# Patient Record
Sex: MALE | Race: WHITE | NOT HISPANIC OR LATINO | Employment: OTHER | URBAN - METROPOLITAN AREA
[De-identification: names, ages, dates, MRNs, and addresses within clinical notes are randomized per-mention and may not be internally consistent; named-entity substitution may affect disease eponyms.]

---

## 2017-01-18 NOTE — TELEPHONE ENCOUNTER
Metoprolol Succinate ER Oral Tablet Extended Release 24 Hour 50 MG      Last Written Prescription Date: 10/9/15  Last Fill Quantity: 90, # refills: 4  Last Office Visit with G, P or [facility] prescribing provider: 10/20/15       POTASSIUM   Date Value Ref Range Status   09/26/2015 4.3 mmol/L Final     CREATININE   Date Value Ref Range Status   09/26/2015 1.41* mg/dL Final     BP Readings from Last 3 Encounters:   10/20/15 130/78   10/09/15 138/70   06/04/15 134/58

## 2017-01-20 RX ORDER — METOPROLOL SUCCINATE 50 MG/1
TABLET, EXTENDED RELEASE ORAL
Qty: 90 TABLET | Refills: 3 | OUTPATIENT
Start: 2017-01-20

## 2017-01-22 DIAGNOSIS — I10 HYPERTENSION GOAL BP (BLOOD PRESSURE) < 140/90: Primary | Chronic | ICD-10-CM

## 2017-01-24 RX ORDER — METOPROLOL SUCCINATE 50 MG/1
TABLET, EXTENDED RELEASE ORAL
Qty: 90 TABLET | Refills: 2 | OUTPATIENT
Start: 2017-01-24

## 2017-01-24 NOTE — TELEPHONE ENCOUNTER
Patient is no longer under our care.  Declined Rx to pharmacy  Vickie Vang RN- Triage [employer]

## 2017-01-24 NOTE — TELEPHONE ENCOUNTER
Metoprolol Succinate ER Oral Tablet Extended Release 24 Hour 50 MG   Last Written Prescription Date: 10/9/15  Last Fill Quantity: 90, # refills: 4  Last Office Visit with G, P or [facility] prescribing provider: 10/20/15       POTASSIUM   Date Value Ref Range Status   09/26/2015 4.3 mmol/L Final     CREATININE   Date Value Ref Range Status   09/26/2015 1.41* mg/dL Final     BP Readings from Last 3 Encounters:   10/20/15 130/78   10/09/15 138/70   06/04/15 134/58

## 2018-07-26 ENCOUNTER — AMBULATORY - HEALTHEAST (OUTPATIENT)
Dept: ADMINISTRATIVE | Facility: CLINIC | Age: 78
End: 2018-07-26

## 2018-07-27 ENCOUNTER — OFFICE VISIT - HEALTHEAST (OUTPATIENT)
Dept: GERIATRICS | Facility: CLINIC | Age: 78
End: 2018-07-27

## 2018-07-27 DIAGNOSIS — D64.9 ANEMIA: ICD-10-CM

## 2018-07-27 DIAGNOSIS — N40.0 BPH (BENIGN PROSTATIC HYPERPLASIA): ICD-10-CM

## 2018-07-27 DIAGNOSIS — I48.91 ATRIAL FIBRILLATION (H): ICD-10-CM

## 2018-07-27 DIAGNOSIS — Z96.659 S/P TOTAL KNEE ARTHROPLASTY: ICD-10-CM

## 2018-07-28 ENCOUNTER — RECORDS - HEALTHEAST (OUTPATIENT)
Dept: LAB | Facility: CLINIC | Age: 78
End: 2018-07-28

## 2018-07-28 LAB — INR PPP: 1.69 (ref 0.9–1.1)

## 2018-07-30 ENCOUNTER — RECORDS - HEALTHEAST (OUTPATIENT)
Dept: LAB | Facility: CLINIC | Age: 78
End: 2018-07-30

## 2018-07-30 ENCOUNTER — OFFICE VISIT - HEALTHEAST (OUTPATIENT)
Dept: GERIATRICS | Facility: CLINIC | Age: 78
End: 2018-07-30

## 2018-07-30 DIAGNOSIS — G47.00 INSOMNIA: ICD-10-CM

## 2018-07-30 DIAGNOSIS — Z96.659 S/P TOTAL KNEE ARTHROPLASTY: ICD-10-CM

## 2018-07-30 DIAGNOSIS — I48.91 ATRIAL FIBRILLATION (H): ICD-10-CM

## 2018-07-30 LAB — INR PPP: 1.76 (ref 0.9–1.1)

## 2018-07-31 ENCOUNTER — COMMUNICATION - HEALTHEAST (OUTPATIENT)
Dept: GERIATRICS | Facility: CLINIC | Age: 78
End: 2018-07-31

## 2018-08-01 ENCOUNTER — OFFICE VISIT - HEALTHEAST (OUTPATIENT)
Dept: GERIATRICS | Facility: CLINIC | Age: 78
End: 2018-08-01

## 2018-08-01 DIAGNOSIS — Z96.652 S/P TOTAL KNEE REPLACEMENT, LEFT: ICD-10-CM

## 2018-08-01 DIAGNOSIS — R53.81 PHYSICAL DECONDITIONING: ICD-10-CM

## 2018-08-02 ENCOUNTER — RECORDS - HEALTHEAST (OUTPATIENT)
Dept: LAB | Facility: CLINIC | Age: 78
End: 2018-08-02

## 2018-08-03 ENCOUNTER — OFFICE VISIT - HEALTHEAST (OUTPATIENT)
Dept: GERIATRICS | Facility: CLINIC | Age: 78
End: 2018-08-03

## 2018-08-03 DIAGNOSIS — I48.91 ATRIAL FIBRILLATION (H): ICD-10-CM

## 2018-08-03 DIAGNOSIS — Z96.652 S/P TOTAL KNEE REPLACEMENT, LEFT: ICD-10-CM

## 2018-08-03 DIAGNOSIS — D64.9 ANEMIA: ICD-10-CM

## 2018-08-03 LAB
HGB BLD-MCNC: 10.6 G/DL (ref 14–18)
INR PPP: 1.86 (ref 0.9–1.1)

## 2018-08-06 ENCOUNTER — OFFICE VISIT - HEALTHEAST (OUTPATIENT)
Dept: GERIATRICS | Facility: CLINIC | Age: 78
End: 2018-08-06

## 2018-08-06 ENCOUNTER — RECORDS - HEALTHEAST (OUTPATIENT)
Dept: LAB | Facility: CLINIC | Age: 78
End: 2018-08-06

## 2018-08-06 DIAGNOSIS — Z96.652 S/P TOTAL KNEE REPLACEMENT, LEFT: ICD-10-CM

## 2018-08-06 DIAGNOSIS — I48.91 ATRIAL FIBRILLATION (H): ICD-10-CM

## 2018-08-06 DIAGNOSIS — D64.9 ANEMIA: ICD-10-CM

## 2018-08-06 LAB — INR PPP: 2.08 (ref 0.9–1.1)

## 2018-08-07 ENCOUNTER — AMBULATORY - HEALTHEAST (OUTPATIENT)
Dept: GERIATRICS | Facility: CLINIC | Age: 78
End: 2018-08-07

## 2019-06-04 ENCOUNTER — OFFICE VISIT - RIVER FALLS (OUTPATIENT)
Dept: FAMILY MEDICINE | Facility: CLINIC | Age: 79
End: 2019-06-04

## 2021-06-01 VITALS — WEIGHT: 220 LBS | BODY MASS INDEX: 31.57 KG/M2

## 2021-06-19 NOTE — PROGRESS NOTES
[facility]    Facility:   [facility] [[id number]]   Code Status: POLST AVAILABLE    Reassessment of 77-year-old male who underwent left TKA for advanced DJD unamenable to conservative means of management, perioperative course complicated with left femoral DVT, extensive bleeding during procedure requiring five units of packed red blood cells, stabilized in hospital and transferred to TCU for rehabilitation, management of medical problems which include chronic atrial fibrillation anticoagulated, hypertension.    Review of systems: patient is seen with his wife on this occasion. Has been reevaluated by orthopedics, orthopedics aware of extensive swelling and bruising of left thigh and lower extremity. Pain left knee and entire left leg gradually improving. Denies chest pain or palpitations. No orthopnea or PND. No pleuritic chest discomfort. No active G.I. or  symptoms. Wishes to review multiple issues in his life. Remainder of 12 point review of systems obtained negative.    Exam: blood pressure 118/69, temperature 98.2, pulse 70, respiratory 20, 02 saturation 92% on room air. Comfortable, sitting in lounger, oriented times three, highly conversant. No pharyngeal erythema. No HJR. No cervical adenopathy. S1 and S2 irregular with systolic murmur. Pulmonary exam with diminished air movement extreme lower bases, no wheezes rales or rhonchi. Abdomen without masses tenderness organomegaly. Extensive edema left thigh and lower extremity, 3 mm nonpitting, diffuse bruising of limb. Pedal pulses symmetrical. Sensation and strength intact lower extremity.    Hemoglobin 10.6. INR 1.86 on 4.5 mg Coumadin, last INR 1.76, increase coumadin to 5 mg Coumadin with recheck INR 72 hours.    Impression and plan:   status post left TKA, s/p 5 units of packed red blood cells for extensive bleeding during procedure, hemoglobin stable, extensive soft-tissue swelling remains, reviewed need  for leg elevation when not ambulating, no evidence of compartmental syndrome.   Anticoagulation with subtherapeutic INR and adjustment of Coumadin.   Hypertension with adequate control of blood pressure.   Atrial fibrillation with heart rate controlled.   Multiple issues reviewed with patient and patient's wife, medications reviewed.      Electronically signed by: Maggie Leary MD

## 2021-06-19 NOTE — PROGRESS NOTES
[facility]    Facility:   [facility] [[id number]]   Code Status: POLST AVAILABLE    Reassessment of 77-year-old male who underwent left TKA for advanced symptomatic DJD of left knee, perioperative course complicated with DVT left femoral, post operatively required five units of packed red blood cells for extensive bleeding, stabilized and transferred to TCU for rehabilitation, management of medical problems which include hypertension, BPH without obstruction.    Review of systems: highly frustrated and angry. Asked to be seen on two occasions on this date, is seen with wife. 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 Believes he needs physical therapy only. Has expressed his anger and agitation regarding occupational therapy measures with multiple staff members including nursing supervisor and occupational therapists. Ongoing pain left knee, mild decrease in diffuse swelling of entire thigh and calf. No chest pain or palpitations. No fever sweats or chills. No focal neurologic deficits. Tramadol beneficial in pain management. Remainder of 12 point review of systems obtained negative.    Exam: blood pressure 134/76, temperature 98.2, pulse 66, respiratory 20, 02 90% on room air. Alert and oriented times three, highly articulate. No facial asymmetry. No pharyngeal erythema. No HJR. S1 and S2 irregular. Pulmonary exam without rales rhonchi or wheezes. Abdomen without masses tenderness organomegaly. Extensive bruising and soft-tissue swelling of thigh and lower extremity, edema nonpitting of 3 mm. Surgical site left knee with wound edges well approximated, no drainage or surrounding erythema. Pedal pulses symmetrical. DJD changes digital joints.    Impression and plan:   status post left TKA, extensive bleeding  during procedure, status post five units of packed red blood cells, hemoglobin stable, continues with massive swelling  left thigh and lower extremity, edema gradually resolving, pain control adequate, no evidence of infection.   Chronic anticoagulation for atrial fibrillation, INR reviewed, Coumadin adjusted.   Hypertension on beta blocker, blood pressure control adequate.   BPH without obstruction.   Multiple frustrations regarding interactions with occupational therapy, reviewed in detail, patient's concerns reported to occupational therapy physical therapy nursing staff and nursing supervisor. Patient does not wish to participate in occupational therapy, his frustrations are significant. Is agreeable to continuing physical therapy and is agreeable to staying in facility additional four days to monitor status of left thigh and lower extremity. Total time of evaluation greater than  40 minutes, greater than 50% of time spent in coordination of care and counseling.      Electronically signed by: Maggie Leary MD

## 2021-06-19 NOTE — PROGRESS NOTES
[facility]    Facility:   [facility] [[id number]]   Code Status: POLST AVAILABLE       Reevaluation of patient who underwent left TKA for advanced degenerative arthritis, postoperative course complicated with anemia, hemoglobin 9 on discharge from hospital, DVT left femoral, chronic atrial fibrillation and hypertension, transferred to TCU for rehabilitation and management of medical problems.    Review of systems: patient is seen with wife on this occasion. Persistent extensive swelling of left thigh and leg, pain left knee adequately controlled with tramadol. Denies palpitations. Significant fatigue present. Mild dyspnea with activity, no exertional chest discomfort. Denies fever sweats or chills. Insomnia, questions if medication can be given for insomnia. No pleuritic chest pain. Remainder of 12 point review of systems obtained negative.    Exam: pleasant and alert, lying in bed, blood pressure 122/72, temperature 98.4, pulse 68 and irregular, respiratory 20, 02 95% on room air. Mucous membranes moist. No HJR. S1 and S2 irregular. Pulmonary exam without rales rhonchi or wheezes. Abdomen without masses tenderness organomegaly. Extensive edema left thigh and lower extremity, extensive superficial ecchymoses, no warmth, covered surgical site, no surrounding erythema, mild tenderness. Pedal pulses symmetrical.    Impression and plan: status post left TKA, postoperative course complicated with anemia, repeat hemoglobin pending, discharge at hospital 9.0, extensive swelling left leg, post operative femoral DVT, remains on Coumadin, home dose Monday Wednesday Friday 5 mg, 2.5 mg other. INR pending. Atrial fibrillation with heart rate controlled. History of hypertension with blood pressure controlled. Complaints of insomnia, begin melatonin 3 mg QHS PRN. Concerns of patient and wife reviewed in detail.        Electronically signed by: Maggie Leary MD

## 2021-06-19 NOTE — PROGRESS NOTES
[facility]    Facility:   [facility] [[id number]]   Code Status: POLST AVAILABLE    Admission evaluation to TCU you of 77 year old male. History is taken from accompanying hospital notes and from patient with wife in attendance, both adequate historians. Under went left TKA for advanced degenerative arthritis unamenable to conservative means of management, postoperative course complicated with DVT left femoral and anemia, extensive bleeding during procedure, stabilized in hospital and transferred to TCU for rehabilitation, pain management, wound care, management of medical problems which include atrial fibrillation, sleep apnea on CPAP, chronic GERD, allergic rhinitis, history of prostate cancer.    Past medical history, current medical problem list, drug allergies, current medication list, social history, family history, code status reviewed in epic.    Review of systems: significant pain left knee, tramadol giving adequate relief. Extensive swelling left calf and thigh, diagnosed with DVT, no pulmonary symptoms, no history of pulmonary embolus. Denies chest pain or palpitations. Energy level diminished. Reluctant to have ace wrap on left leg. No current GERD symptomatology. Using CPAP on a regular basis. No focal neurologic deficits. Remainder of 12 point review of systems obtained negative. Desires to discuss multiple social issues past and present. Wife with concerns.    Exam: vital signs reviewed since admission to TCU. Alert, highly conversant, oriented times three. No facial asymmetry. Mucous membranes moist. No carotid bruits or HJR. No cervical adenopathy. Thyroid without nodularity or tenderness. S1 and S2 irregular with 1/6 systolic murmur. Pulmonary exam without rales rhonchi or wheezes. Abdomen without masses tenderness organomegaly. Surgical site left knee without warmth or erythema, no drainage on dressing, extensive firm swelling left calf and thigh,  extensive ecchymoses. Pedal pulses minus one and symmetrical. Strength intact bilateral feet. DJD changes digital joints.    Impression and plan:   status post left TKA, postoperative course complicated with femoral DVT, on Coumadin, INR  not obtained today, Coumadin orders revised, INR in 24 hours   Postoperative anemia, hemoglobin will be followed while in TCU.   Atrial fibrillation with heart rate controlled.   History of hypertension, blood pressure control adequate.   Sleep apnea with use of CPAP.   Allergic rhinitis not currently symptomatic.   GERD on omeprazole, no evidence of acute G.I. distress.   Pain management adequate with tramadol.   BPH on Pro scar with out symptoms of obstruction.   Leg elevation at indicated, monitor for therapeutic anticoagulation with DVT present. Total time of evaluation greater than 45 minutes, greater than 50% of time spent in coordination of care and counseling.      Electronically signed by: Maggie Leary MD

## 2021-06-19 NOTE — PROGRESS NOTES
[facility]    Facility:   [facility] [[id number]]   Code Status: DNR      CHIEF COMPLAINT/REASON FOR VISIT:  Chief Complaint   Patient presents with     Review Of Multiple Medical Conditions     physical deconditioning, s/p LTKR       HISTORY:      HPI: Eric is a 77 y.o. male who under went left TKA for advanced degenerative arthritis unamenable to conservative means of management, postoperative course complicated with DVT left femoral and anemia, extensive bleeding during procedure, stabilized in hospital and transferred to TCU for rehabilitation, pain management, wound care, management of medical problems which include atrial fibrillation, sleep apnea on CPAP, chronic GERD, allergic rhinitis, history of prostate cancer.  Today he is being evaluated for a routine review of multiple medical problems while in the TCU. He reports he has been fairly stable. His one concern is that his left leg is still swollen. He states he does have some baseline problems with anemia but as been doing relatively good as of late. His pain has been relatively well controlled. He does not have any acute issues to discuss. He feels PT/OT is going very well and is happy with his progress. His wife is at bedside and they share they have been traveling around in their RV. They travel around the [place] in the winter months and return to [place] during the summer to be near their children. He denies any other concerns including fevers/chills, cough or cold symptoms, headaches, vision changes, chest pain/pressure, difficulty breathing, SOB, abdominal pain, nausea, vomiting, diarrhea, dysuria, increasing weakness, increasing pain.     Past Medical History:   Diagnosis Date     Acute blood loss anemia      DIEGO (acute kidney injury) (H)      Atrial fibrillation (H)      BPH (benign prostatic hyperplasia)      CVA (cerebral vascular accident) (H)      GERD (gastroesophageal reflux disease)       Hematoma      HTN (hypertension)      Hyperkalemia      Hyponatremia      Osteoarthritis of knee      Prostate cancer (H)              Family History   Problem Relation Age of Onset     Hypertension Mother      Osteoarthritis Mother      Acute Myocardial Infarction Father      Hypertension Father      Social History     Social History     Marital status:      Spouse name: N/A     Number of children: N/A     Years of education: N/A     Social History Main Topics     Smoking status: Former Smoker     Smokeless tobacco: Never Used     Alcohol use No     Drug use: Not on file     Sexual activity: Not on file     Other Topics Concern     Not on file     Social History Narrative       REVIEW OF SYSTEM:  Pertinent items are noted in HPI.    PHYSICAL EXAM:   /69  Pulse 70  Temp 98.2  F (36.8  C)  Resp 20  Wt 220 lb (99.8 kg)  SpO2 92%  General appearance: alert, appears stated age and cooperative  Head: Normocephalic, without obvious abnormality, atraumatic  Lungs: clear to auscultation bilaterally  Heart: S1, S2-irregular rhythm, slight systolic murmur appreciated at LSB  Abdomen: soft, non-tender; bowel sounds normal; no masses,  no organomegaly  Extremities: extremities normal, atraumatic except for LTKR, no cyanosis or edema  Pulses: 2+ and symmetric  Skin: Skin color, texture, turgor normal. No rashes or lesions, incision to left knee clean/dry/approximated  Neurologic: Grossly normal      LABS:   None today, should follow hgb.     ASSESSMENT:      ICD-10-CM    1. Physical deconditioning R53.81    2. S/P total knee replacement, left Z96.652        PLAN:    Physical Deconditioning due to LTKR  -Continue PT/OT and other therapies as per care plan.  -Encouraged good nutrition and movement habits.   -Discussed care plan and expected course of stay.   -Ice left knee 4 times daily for 20 minutes each time.   -Ace wrap left leg in am and take off in pm, elevate legs as much as possible.  -Continue to  follow-up per routine schedule or sooner if needed.     Otherwise continue current care plan for all other chronic medical conditions, as they are stable. Encouraged patient to engage in healthy lifestyle behaviors such as engaging in social activities, exercising (PT/OT), eating well, and following care plan. Follow up for routine check-up, or sooner if needed. Will continue to monitor patient and work with nursing staff collaboratively to work toward positive patient outcomes.    Greater than 25 minutes spent with patient with at least 55% of this time spent on review of previous records, counseling, education, and discussion of the above care plan with nursing staff, and patient.     Electronically signed by: Bianka Johnson CNP

## 2022-02-11 VITALS — SYSTOLIC BLOOD PRESSURE: 82 MMHG | WEIGHT: 213 LBS | DIASTOLIC BLOOD PRESSURE: 54 MMHG | HEART RATE: 60 BPM

## 2022-02-16 NOTE — NURSING NOTE
Comprehensive Intake Entered On:  6/4/2019 1:43 PM CDT    Performed On:  6/4/2019 1:36 PM CDT by Candice Marte RN               Summary   Chief Complaint :   Patient is here for cardio follow up.    Weight Measured :   213.0 lb(Converted to: 213 lb 0 oz, 96.62 kg)    Systolic Blood Pressure :   82 mmHg (LOW)    Diastolic Blood Pressure :   54 mmHg (LOW)    Mean Arterial Pressure :   63 mmHg   Peripheral Pulse Rate :   60 bpm   BP Site :   Right arm   Pulse Site :   Radial artery   BP Method :   Manual   HR Method :   Manual   Candice Marte RN - 6/4/2019 1:36 PM CDT   Health Status   Allergies Verified? :   Yes   Medication History Verified? :   Yes   Pre-Visit Planning Status :   Completed   Candice Marte RN - 6/4/2019 1:36 PM CDT   Meds / Allergies   (As Of: 6/4/2019 1:43:32 PM CDT)   Allergies (Active)   No Known Medication Allergies  Estimated Onset Date:   Unspecified ; Created By:   Candice Marte RN; Reaction Status:   Active ; Category:   Drug ; Substance:   No Known Medication Allergies ; Type:   Allergy ; Updated By:   Candice Marte RN; Reviewed Date:   6/4/2019 1:39 PM CDT        Medication List   (As Of: 6/4/2019 1:43:32 PM CDT)

## 2023-06-13 ENCOUNTER — TRANSCRIBE ORDERS (OUTPATIENT)
Dept: OTHER | Age: 83
End: 2023-06-13

## 2023-06-13 DIAGNOSIS — I50.22 CHRONIC SYSTOLIC CHF (CONGESTIVE HEART FAILURE) (H): Primary | ICD-10-CM

## 2025-01-01 ENCOUNTER — LAB REQUISITION (OUTPATIENT)
Dept: LAB | Facility: CLINIC | Age: 85
End: 2025-01-01
Payer: COMMERCIAL

## 2025-01-01 DIAGNOSIS — Z11.1 ENCOUNTER FOR SCREENING FOR RESPIRATORY TUBERCULOSIS: ICD-10-CM

## 2025-01-01 LAB
GAMMA INTERFERON BACKGROUND BLD IA-ACNC: 0.06 IU/ML
M TB IFN-G BLD-IMP: NEGATIVE
M TB IFN-G CD4+ BCKGRND COR BLD-ACNC: 3.87 IU/ML
MITOGEN IGNF BCKGRD COR BLD-ACNC: 0 IU/ML
MITOGEN IGNF BCKGRD COR BLD-ACNC: 0 IU/ML
QUANTIFERON MITOGEN: 3.93 IU/ML
QUANTIFERON NIL TUBE: 0.06 IU/ML
QUANTIFERON TB1 TUBE: 0.06 IU/ML
QUANTIFERON TB2 TUBE: 0.06

## 2025-01-01 PROCEDURE — 86481 TB AG RESPONSE T-CELL SUSP: CPT | Mod: ORL | Performed by: INTERNAL MEDICINE

## 2025-01-01 PROCEDURE — 36415 COLL VENOUS BLD VENIPUNCTURE: CPT | Mod: ORL | Performed by: INTERNAL MEDICINE

## 2025-01-01 PROCEDURE — P9604 ONE-WAY ALLOW PRORATED TRIP: HCPCS | Mod: ORL | Performed by: INTERNAL MEDICINE

## 2025-05-02 ENCOUNTER — APPOINTMENT (OUTPATIENT)
Dept: GENERAL RADIOLOGY | Facility: CLINIC | Age: 85
End: 2025-05-02
Attending: EMERGENCY MEDICINE
Payer: COMMERCIAL

## 2025-05-02 ENCOUNTER — HOSPITAL ENCOUNTER (INPATIENT)
Facility: CLINIC | Age: 85
LOS: 4 days | Discharge: NURSING, REHAB OR LONG-TERM CARE | End: 2025-05-07
Attending: EMERGENCY MEDICINE | Admitting: INTERNAL MEDICINE
Payer: COMMERCIAL

## 2025-05-02 DIAGNOSIS — I50.23 ACUTE ON CHRONIC SYSTOLIC CONGESTIVE HEART FAILURE (H): Primary | ICD-10-CM

## 2025-05-02 DIAGNOSIS — N17.9 AKI (ACUTE KIDNEY INJURY): ICD-10-CM

## 2025-05-02 DIAGNOSIS — R79.89 ELEVATED TROPONIN: ICD-10-CM

## 2025-05-02 DIAGNOSIS — R79.89 ELEVATED BRAIN NATRIURETIC PEPTIDE (BNP) LEVEL: ICD-10-CM

## 2025-05-02 DIAGNOSIS — J90 PLEURAL EFFUSION: ICD-10-CM

## 2025-05-02 LAB
ALBUMIN SERPL BCG-MCNC: 3.8 G/DL (ref 3.5–5.2)
ALP SERPL-CCNC: 109 U/L (ref 40–150)
ALT SERPL W P-5'-P-CCNC: 9 U/L (ref 0–70)
ANION GAP SERPL CALCULATED.3IONS-SCNC: 11 MMOL/L (ref 7–15)
AST SERPL W P-5'-P-CCNC: 24 U/L (ref 0–45)
BASOPHILS # BLD AUTO: 0.1 10E3/UL (ref 0–0.2)
BASOPHILS NFR BLD AUTO: 1 %
BILIRUB SERPL-MCNC: 0.9 MG/DL
BUN SERPL-MCNC: 35.2 MG/DL (ref 8–23)
CALCIUM SERPL-MCNC: 9 MG/DL (ref 8.8–10.4)
CHLORIDE SERPL-SCNC: 101 MMOL/L (ref 98–107)
CREAT SERPL-MCNC: 1.18 MG/DL (ref 0.67–1.17)
DIGOXIN SERPL-MCNC: 1.2 NG/ML (ref 0.6–1.2)
EGFRCR SERPLBLD CKD-EPI 2021: 60 ML/MIN/1.73M2
EOSINOPHIL # BLD AUTO: 0.1 10E3/UL (ref 0–0.7)
EOSINOPHIL NFR BLD AUTO: 1 %
ERYTHROCYTE [DISTWIDTH] IN BLOOD BY AUTOMATED COUNT: 17.2 % (ref 10–15)
GLUCOSE SERPL-MCNC: 100 MG/DL (ref 70–99)
HCO3 SERPL-SCNC: 28 MMOL/L (ref 22–29)
HCT VFR BLD AUTO: 49.8 % (ref 40–53)
HGB BLD-MCNC: 16.3 G/DL (ref 13.3–17.7)
IMM GRANULOCYTES # BLD: 0 10E3/UL
IMM GRANULOCYTES NFR BLD: 0 %
INR PPP: 1.48 (ref 0.85–1.15)
INR PPP: 1.59 (ref 0.85–1.15)
LYMPHOCYTES # BLD AUTO: 0.8 10E3/UL (ref 0.8–5.3)
LYMPHOCYTES NFR BLD AUTO: 11 %
MCH RBC QN AUTO: 30.5 PG (ref 26.5–33)
MCHC RBC AUTO-ENTMCNC: 32.7 G/DL (ref 31.5–36.5)
MCV RBC AUTO: 93 FL (ref 78–100)
MONOCYTES # BLD AUTO: 0.5 10E3/UL (ref 0–1.3)
MONOCYTES NFR BLD AUTO: 7 %
NEUTROPHILS # BLD AUTO: 5.6 10E3/UL (ref 1.6–8.3)
NEUTROPHILS NFR BLD AUTO: 80 %
NRBC # BLD AUTO: 0 10E3/UL
NRBC BLD AUTO-RTO: 0 /100
NT-PROBNP SERPL-MCNC: ABNORMAL PG/ML (ref 0–1800)
PLATELET # BLD AUTO: 228 10E3/UL (ref 150–450)
POTASSIUM SERPL-SCNC: 4.5 MMOL/L (ref 3.4–5.3)
PROT SERPL-MCNC: 6.5 G/DL (ref 6.4–8.3)
PROTHROMBIN TIME: 17.9 SECONDS (ref 11.8–14.8)
PROTHROMBIN TIME: 18.9 SECONDS (ref 11.8–14.8)
RBC # BLD AUTO: 5.34 10E6/UL (ref 4.4–5.9)
SODIUM SERPL-SCNC: 140 MMOL/L (ref 135–145)
TROPONIN T SERPL HS-MCNC: 21 NG/L
TROPONIN T SERPL HS-MCNC: 26 NG/L
WBC # BLD AUTO: 7 10E3/UL (ref 4–11)

## 2025-05-02 PROCEDURE — G0378 HOSPITAL OBSERVATION PER HR: HCPCS

## 2025-05-02 PROCEDURE — 71046 X-RAY EXAM CHEST 2 VIEWS: CPT

## 2025-05-02 PROCEDURE — 82040 ASSAY OF SERUM ALBUMIN: CPT | Performed by: EMERGENCY MEDICINE

## 2025-05-02 PROCEDURE — 85610 PROTHROMBIN TIME: CPT | Performed by: HOSPITALIST

## 2025-05-02 PROCEDURE — 36415 COLL VENOUS BLD VENIPUNCTURE: CPT | Performed by: EMERGENCY MEDICINE

## 2025-05-02 PROCEDURE — 84484 ASSAY OF TROPONIN QUANT: CPT | Performed by: HOSPITALIST

## 2025-05-02 PROCEDURE — 93005 ELECTROCARDIOGRAM TRACING: CPT

## 2025-05-02 PROCEDURE — 99223 1ST HOSP IP/OBS HIGH 75: CPT | Performed by: HOSPITALIST

## 2025-05-02 PROCEDURE — 80162 ASSAY OF DIGOXIN TOTAL: CPT | Performed by: HOSPITALIST

## 2025-05-02 PROCEDURE — 83880 ASSAY OF NATRIURETIC PEPTIDE: CPT | Performed by: EMERGENCY MEDICINE

## 2025-05-02 PROCEDURE — 120N000001 HC R&B MED SURG/OB

## 2025-05-02 PROCEDURE — 96374 THER/PROPH/DIAG INJ IV PUSH: CPT

## 2025-05-02 PROCEDURE — 99285 EMERGENCY DEPT VISIT HI MDM: CPT | Mod: 25

## 2025-05-02 PROCEDURE — 85610 PROTHROMBIN TIME: CPT | Performed by: EMERGENCY MEDICINE

## 2025-05-02 PROCEDURE — 250N000011 HC RX IP 250 OP 636: Performed by: EMERGENCY MEDICINE

## 2025-05-02 PROCEDURE — 84484 ASSAY OF TROPONIN QUANT: CPT | Performed by: EMERGENCY MEDICINE

## 2025-05-02 PROCEDURE — 36415 COLL VENOUS BLD VENIPUNCTURE: CPT | Performed by: HOSPITALIST

## 2025-05-02 PROCEDURE — 85025 COMPLETE CBC W/AUTO DIFF WBC: CPT | Performed by: EMERGENCY MEDICINE

## 2025-05-02 RX ORDER — SPIRONOLACTONE 25 MG
12.5 TABLET ORAL DAILY
Status: DISCONTINUED | OUTPATIENT
Start: 2025-05-03 | End: 2025-05-07 | Stop reason: HOSPADM

## 2025-05-02 RX ORDER — VITAMIN B COMPLEX
25 TABLET ORAL EVERY EVENING
COMMUNITY

## 2025-05-02 RX ORDER — FUROSEMIDE 40 MG/1
40 TABLET ORAL DAILY
Status: ON HOLD | COMMUNITY
End: 2025-05-07

## 2025-05-02 RX ORDER — FUROSEMIDE 10 MG/ML
40 INJECTION INTRAMUSCULAR; INTRAVENOUS
Status: DISCONTINUED | OUTPATIENT
Start: 2025-05-03 | End: 2025-05-05

## 2025-05-02 RX ORDER — METOPROLOL SUCCINATE 25 MG/1
50 TABLET, EXTENDED RELEASE ORAL DAILY
Status: CANCELLED | OUTPATIENT
Start: 2025-05-03

## 2025-05-02 RX ORDER — AMOXICILLIN 250 MG
2 CAPSULE ORAL 2 TIMES DAILY PRN
Status: DISCONTINUED | OUTPATIENT
Start: 2025-05-02 | End: 2025-05-07 | Stop reason: HOSPADM

## 2025-05-02 RX ORDER — FUROSEMIDE 10 MG/ML
60 INJECTION INTRAMUSCULAR; INTRAVENOUS ONCE
Status: COMPLETED | OUTPATIENT
Start: 2025-05-02 | End: 2025-05-02

## 2025-05-02 RX ORDER — MELOXICAM 7.5 MG/1
7.5 TABLET ORAL DAILY
COMMUNITY

## 2025-05-02 RX ORDER — MIDODRINE HYDROCHLORIDE 10 MG/1
10 TABLET ORAL
Status: DISCONTINUED | OUTPATIENT
Start: 2025-05-03 | End: 2025-05-07 | Stop reason: HOSPADM

## 2025-05-02 RX ORDER — ATORVASTATIN CALCIUM 20 MG/1
20 TABLET, FILM COATED ORAL DAILY
Status: DISCONTINUED | OUTPATIENT
Start: 2025-05-03 | End: 2025-05-03

## 2025-05-02 RX ORDER — PROCHLORPERAZINE MALEATE 5 MG/1
5 TABLET ORAL EVERY 6 HOURS PRN
Status: DISCONTINUED | OUTPATIENT
Start: 2025-05-02 | End: 2025-05-07 | Stop reason: HOSPADM

## 2025-05-02 RX ORDER — PANTOPRAZOLE SODIUM 40 MG/1
40 TABLET, DELAYED RELEASE ORAL DAILY
Status: DISCONTINUED | OUTPATIENT
Start: 2025-05-03 | End: 2025-05-07 | Stop reason: HOSPADM

## 2025-05-02 RX ORDER — FINASTERIDE 5 MG/1
5 TABLET, FILM COATED ORAL DAILY
Status: DISCONTINUED | OUTPATIENT
Start: 2025-05-03 | End: 2025-05-03

## 2025-05-02 RX ORDER — SPIRONOLACTONE 25 MG
12.5 TABLET ORAL DAILY
COMMUNITY

## 2025-05-02 RX ORDER — MIDODRINE HYDROCHLORIDE 10 MG/1
10 TABLET ORAL 3 TIMES DAILY
Status: ON HOLD | COMMUNITY
End: 2025-05-07

## 2025-05-02 RX ORDER — AMOXICILLIN 250 MG
1 CAPSULE ORAL 2 TIMES DAILY PRN
Status: DISCONTINUED | OUTPATIENT
Start: 2025-05-02 | End: 2025-05-07 | Stop reason: HOSPADM

## 2025-05-02 RX ORDER — DIGOXIN 125 MCG
125 TABLET ORAL DAILY
Status: ON HOLD | COMMUNITY
End: 2025-05-03

## 2025-05-02 RX ORDER — ONDANSETRON 4 MG/1
4 TABLET, ORALLY DISINTEGRATING ORAL EVERY 6 HOURS PRN
Status: DISCONTINUED | OUTPATIENT
Start: 2025-05-02 | End: 2025-05-07 | Stop reason: HOSPADM

## 2025-05-02 RX ORDER — DOXAZOSIN 1 MG/1
1 TABLET ORAL DAILY
Status: CANCELLED | OUTPATIENT
Start: 2025-05-03

## 2025-05-02 RX ORDER — ATORVASTATIN CALCIUM 40 MG/1
40 TABLET, FILM COATED ORAL EVERY EVENING
COMMUNITY

## 2025-05-02 RX ORDER — ASPIRIN 81 MG/1
81 TABLET ORAL EVERY EVENING
COMMUNITY

## 2025-05-02 RX ORDER — DIGOXIN 125 MCG
125 TABLET ORAL DAILY
Status: DISCONTINUED | OUTPATIENT
Start: 2025-05-03 | End: 2025-05-03

## 2025-05-02 RX ORDER — ONDANSETRON 2 MG/ML
4 INJECTION INTRAMUSCULAR; INTRAVENOUS EVERY 6 HOURS PRN
Status: DISCONTINUED | OUTPATIENT
Start: 2025-05-02 | End: 2025-05-07 | Stop reason: HOSPADM

## 2025-05-02 RX ADMIN — FUROSEMIDE 60 MG: 10 INJECTION, SOLUTION INTRAMUSCULAR; INTRAVENOUS at 19:02

## 2025-05-02 ASSESSMENT — ACTIVITIES OF DAILY LIVING (ADL)
ADLS_ACUITY_SCORE: 41
ADLS_ACUITY_SCORE: 41
ADLS_ACUITY_SCORE: 35
ADLS_ACUITY_SCORE: 41

## 2025-05-02 ASSESSMENT — COLUMBIA-SUICIDE SEVERITY RATING SCALE - C-SSRS
1. IN THE PAST MONTH, HAVE YOU WISHED YOU WERE DEAD OR WISHED YOU COULD GO TO SLEEP AND NOT WAKE UP?: NO
6. HAVE YOU EVER DONE ANYTHING, STARTED TO DO ANYTHING, OR PREPARED TO DO ANYTHING TO END YOUR LIFE?: NO
2. HAVE YOU ACTUALLY HAD ANY THOUGHTS OF KILLING YOURSELF IN THE PAST MONTH?: NO

## 2025-05-02 NOTE — ED TRIAGE NOTES
Pt presents to ED with shortness of breath. Seen byt his doctor and was told to come in due to elevated BNP   Triage Assessment (Adult)       Row Name 05/02/25 [id number]          Triage Assessment    Airway WDL WDL        Respiratory WDL    Respiratory WDL X;rhythm/pattern     Rhythm/Pattern, Respiratory shortness of breath

## 2025-05-02 NOTE — ED PROVIDER NOTES
History     Chief Complaint:  Abnormal Labs       HPI   Eric Poole is a 84 year old male here after PCP visit with a BNP that was elevated above baseline for him and a chest x-ray that had small effusions.  Patient is asymptomatic and this was a first-time visit with this doctor.  He has a pacer is on Eliquis and him taking his water pills and all his medication is as he is supposed to.  He has no headache vision changes chest pain shortness of breath cough no abdominal pain no nausea vomiting or diarrhea no dysuria no leg swelling.      Independent Historian:    Wife    Review of External Notes:  BNP 30,000  August 2023 8,000  Primary care note yesterday  ASSESSMENT AND PLAN:  Eric Poole is a 84 y.o. male who presents for   Chief Complaint   Patient presents with   Medication Management     ICD-10-CM   1. Medicare annual wellness visit, subsequent Z00.00     2. Prostate cancer (HC) C61 AMB CONSULT TO UROLOGY   finasteride 5 mg tablet     3. Dyspnea, unspecified type R06.00 XR CHEST 2 VIEWS PA AND LATERAL   CBC AND DIFFERENTIAL   PRO-BNP   COMP METABOLIC PANEL     4. Hyperlipidemia, unspecified hyperlipidemia type E78.5 LIPID PANEL W REFLEX MEASURED LDL     5. Congestive heart failure, unspecified HF chronicity, unspecified heart failure type (HC) I50.9 DIGOXIN     I have reviewed patient's chart, provider note, labs.  From [place].    Extensive cardiac history as mentioned above; blood pressure and pulse rate acceptable; on Lasix 80 mg in the morning and 40 mg at night; spironolactone 12.5 mg in the morning; apixaban 2.5 mg twice daily; aspirin 81 mg daily; digoxin 125 mcg daily;  Potassium chloride 20 mEq daily.  Due to on and on dyspnea chest x-ray done which reveals bilateral small pleural effusion; heart is mildly enlarged.    Prostate cancer on finasteride 5 mg daily; referred patient to establish care with urologist in [place].    Hyperlipidemia on Lipitor 40 mg daily    Hypotension on midodrine 10  mg 3 times daily.    Further recommendations as per blood work results.    LETICIA patient refuses to use CPAP.    Addendum: Chest x-ray revealed bilateral pleural effusion; informed CA Emilia called patient and patient's spouse and informed them to go to the ED now due to his ongoing dyspnea which could be due to his pleural effusion and see if he is candidate for pleurocentesis          [employer] ICD EVALUATION REPORT  Patient does have an MRI conditional device system.  25  84 y.o.     CONCLUSION:   Where/Type: Remote, Routine  Presenting Rhythm:  Ventricular sensed Ventricular paced   Underlying Rhythm:  N/A, remote check  Last assessment was on 24 and was  V-rates 68 bpm  Pacin% RV paced       High Atrial Rate Episodes:   Previously noted to be persistent A-fib/flutter since 2021, Ventricular response: ~10%>= 110 bpm. Programmed VVIR    Anticoagulation: Yes  (per medication list)  High Ventricular Rate Episodes:   163 total detections; Limited EGMs for review consistent with RVR from AF lasting up to ~1.5 minutes at 150s bpm    Battery and Lead Status: Stable lead measurements and battery status: 10.5 year(s) until LYNDON  Reprogramming: N/A - remote check  Routing: Sent to General Cardiology  as PL2 and 's nursing team re: frequency of RVR episodes  See also attached [employer] Report & PDF(s)        Follow up:   Next remote device check 2025. Prefers paper letters.    Next [facility] office visit due 2026.  Next cardiology appointment due 2025 after echocardiogram.              Echo 2025  Conclusions   Summary   Severe left ventricular enlargement.   Severe left ventricular systolic dysfunction, ejection fraction estimated at   20%.   Elevated LV filling pressures.   Sclerotic aortic valve without significant stenosis.   Mild aortic valve regurgitation.   Mildly dilated left atrium.     Medications:    atorvastatin (LIPITOR) 20 MG  tablet  doxazosin (CARDURA) 1 MG tablet  Fexofenadine HCl (ALLEGRA PO)  finasteride (PROSCAR) 5 MG tablet  metoprolol (TOPROL-XL) 50 MG 24 hr tablet  Multiple Vitamins-Minerals (ICAPS PLUS) TABS  omeprazole (PRILOSEC) 20 MG capsule  warfarin (COUMADIN) 5 MG tablet        Past Medical History:    Past Medical History:   Diagnosis Date    BPH (benign prostatic hypertrophy) 5/14/2013    GERD (gastroesophageal reflux disease) 5/14/2013    Hypertension goal BP (blood pressure) < 140/90     Malignant neoplasm of prostate (H) 5/11/2011       Past Surgical History:    Past Surgical History:   Procedure Laterality Date    CATARACT EXTRACTION Bilateral     ELBOW SURGERY  1995    left    ELBOW SURGERY Left     EYE SURGERY  2012    bilateral cataract removal    KNEE SURGERY  2001    arthroscopic meniscal repair on left     KNEE SURGERY  1960    right knee fracture and repair    KNEE SURGERY  2008    right knee TKA    TOTAL KNEE ARTHROPLASTY Right     TOTAL KNEE ARTHROPLASTY Left 07/17/2018    wisdom teeth removal  age 39          Physical Exam   Patient Vitals for the past 24 hrs:   BP Temp Temp src Pulse Resp SpO2   05/02/25 1721 100/66 97.5  F (36.4  C) Temporal 67 22 96 %        Physical Exam  General: Patient is well appearing. No distress.  Head: Atraumatic.  Eyes: Conjunctivae and EOM are normal. No scleral icterus.  Neck: Normal range of motion. Neck supple.   Cardiovascular: Normal rate, regular rhythm, normal heart sounds and intact distal pulses.   Pulmonary/Chest: Breath sounds normal. No respiratory distress.  Abdominal: Soft. Bowel sounds are normal. No distension. No tenderness. No rebound or guarding.   Musculoskeletal: Normal range of motion.  Skin: Warm and dry. No rash noted. Not diaphoretic.      Emergency Department Course   ECG  Appears paced rhythm ventricular rate 65 it is wide-complex and a left bundle branch pattern and likely has normal irregularity with breathing plus minus A-fib    Imaging:  XR Chest  2 Views   Final Result   IMPRESSION:       Left subclavian approach pacer defibrillator has right atrial appendage and right ventricular leads. The cardiac silhouette is enlarged, however the lower heart borders are largely silhouetted. Ectatic thoracic aorta is unchanged with crescentic    calcification at the arch. Pulmonary arteries at the onofre are prominent but unchanged.      Small pleural effusions and atelectasis in the bases. There are no findings to suggest interstitial or alveolar edema.      Diffuse, flowing mild to moderate thoracic spine degenerative osteophytes.          Laboratory:  Labs Ordered and Resulted from Time of ED Arrival to Time of ED Departure   COMPREHENSIVE METABOLIC PANEL - Abnormal       Result Value    Sodium 140      Potassium 4.5      Carbon Dioxide (CO2) 28      Anion Gap 11      Urea Nitrogen 35.2 (*)     Creatinine 1.18 (*)     GFR Estimate 60 (*)     Calcium 9.0      Chloride 101      Glucose 100 (*)     Alkaline Phosphatase 109      AST 24      ALT 9      Protein Total 6.5      Albumin 3.8      Bilirubin Total 0.9     TROPONIN T, HIGH SENSITIVITY - Abnormal    Troponin T, High Sensitivity 26 (*)    NT PROBNP INPATIENT - Abnormal    N terminal Pro BNP Inpatient 27,253 (*)    CBC WITH PLATELETS AND DIFFERENTIAL - Abnormal    WBC Count 7.0      RBC Count 5.34      Hemoglobin 16.3      Hematocrit 49.8      MCV 93      MCH 30.5      MCHC 32.7      RDW 17.2 (*)     Platelet Count 228      % Neutrophils 80      % Lymphocytes 11      % Monocytes 7      % Eosinophils 1      % Basophils 1      % Immature Granulocytes 0      NRBCs per 100 WBC 0      Absolute Neutrophils 5.6      Absolute Lymphocytes 0.8      Absolute Monocytes 0.5      Absolute Eosinophils 0.1      Absolute Basophils 0.1      Absolute Immature Granulocytes 0.0      Absolute NRBCs 0.0     INR - Abnormal    INR 1.48 (*)     PT 17.9 (*)    TROPONIN T, HIGH SENSITIVITY        Procedures       Emergency Department Course &  Assessments:    Interventions:  Medications   furosemide (LASIX) injection 60 mg (60 mg Intravenous $Given 5/2/25 6924)        Assessments:      Independent Interpretation (X-rays, CTs, rhythm strip):  CXR pacer intact bilateral inguinal pleural effusions right greater than left no nayeli mass or consolidation.  Enlarged cardiac silhouette    Consultations/Discussion of Management or Tests:         Social Drivers of Health affecting care:       Disposition:  Obs tele    Impression & Plan           Medical Decision Making:  Patient is a nice 84-year-old male with list of problems as above already taking high-dose Lasix morning and night at home PCP visit with elevated BNP also has a bump in his troponin here and small bilateral pleural effusions he is not in distress but would probably benefit from IV Lasix and further workup and management with an admission.  He has no chest pain no shortness of breath is requiring no oxygen and is doing quite well but he is very high risk and that BNP is 3 times upper limit of his normal doctor cristela  agrees we will admit to ops telemetry    Diagnosis:    ICD-10-CM    1. Elevated brain natriuretic peptide (BNP) level  R79.89       2. Elevated troponin  R79.89       3. DIEGO (acute kidney injury)  N17.9       4. Pleural effusion  J90            Discharge Medications:  New Prescriptions    No medications on file            5/2/2025   Satnam Bryant MD Stevens, Andrew C, MD  05/02/25 2021

## 2025-05-03 ENCOUNTER — APPOINTMENT (OUTPATIENT)
Dept: CARDIOLOGY | Facility: CLINIC | Age: 85
End: 2025-05-03
Attending: HOSPITALIST
Payer: COMMERCIAL

## 2025-05-03 ENCOUNTER — APPOINTMENT (OUTPATIENT)
Dept: OCCUPATIONAL THERAPY | Facility: CLINIC | Age: 85
End: 2025-05-03
Attending: HOSPITALIST
Payer: COMMERCIAL

## 2025-05-03 LAB
ALBUMIN UR-MCNC: NEGATIVE MG/DL
ANION GAP SERPL CALCULATED.3IONS-SCNC: 10 MMOL/L (ref 7–15)
APPEARANCE UR: CLEAR
BILIRUB UR QL STRIP: NEGATIVE
BUN SERPL-MCNC: 30.8 MG/DL (ref 8–23)
CALCIUM SERPL-MCNC: 8.7 MG/DL (ref 8.8–10.4)
CHLORIDE SERPL-SCNC: 102 MMOL/L (ref 98–107)
COLOR UR AUTO: NORMAL
CREAT SERPL-MCNC: 1.23 MG/DL (ref 0.67–1.17)
EGFRCR SERPLBLD CKD-EPI 2021: 58 ML/MIN/1.73M2
ERYTHROCYTE [DISTWIDTH] IN BLOOD BY AUTOMATED COUNT: 17 % (ref 10–15)
GLUCOSE SERPL-MCNC: 92 MG/DL (ref 70–99)
GLUCOSE UR STRIP-MCNC: NEGATIVE MG/DL
HCO3 SERPL-SCNC: 28 MMOL/L (ref 22–29)
HCT VFR BLD AUTO: 45.2 % (ref 40–53)
HGB BLD-MCNC: 14.8 G/DL (ref 13.3–17.7)
HGB UR QL STRIP: NEGATIVE
INR PPP: 1.67 (ref 0.85–1.15)
KETONES UR STRIP-MCNC: NEGATIVE MG/DL
LEUKOCYTE ESTERASE UR QL STRIP: NEGATIVE
LVEF ECHO: NORMAL
MAGNESIUM SERPL-MCNC: 1.9 MG/DL (ref 1.7–2.3)
MCH RBC QN AUTO: 30.2 PG (ref 26.5–33)
MCHC RBC AUTO-ENTMCNC: 32.7 G/DL (ref 31.5–36.5)
MCV RBC AUTO: 92 FL (ref 78–100)
NITRATE UR QL: NEGATIVE
PH UR STRIP: 6 [PH] (ref 5–7)
PLATELET # BLD AUTO: 206 10E3/UL (ref 150–450)
POTASSIUM SERPL-SCNC: 4.3 MMOL/L (ref 3.4–5.3)
PROTHROMBIN TIME: 19.6 SECONDS (ref 11.8–14.8)
RBC # BLD AUTO: 4.9 10E6/UL (ref 4.4–5.9)
RBC URINE: 1 /HPF
SODIUM SERPL-SCNC: 140 MMOL/L (ref 135–145)
SP GR UR STRIP: 1.01 (ref 1–1.03)
TROPONIN T SERPL HS-MCNC: 25 NG/L
TROPONIN T SERPL HS-MCNC: 26 NG/L
TROPONIN T SERPL HS-MCNC: 27 NG/L
UROBILINOGEN UR STRIP-MCNC: NORMAL MG/DL
WBC # BLD AUTO: 6.5 10E3/UL (ref 4–11)
WBC URINE: <1 /HPF

## 2025-05-03 PROCEDURE — 81001 URINALYSIS AUTO W/SCOPE: CPT | Performed by: INTERNAL MEDICINE

## 2025-05-03 PROCEDURE — 36415 COLL VENOUS BLD VENIPUNCTURE: CPT | Performed by: HOSPITALIST

## 2025-05-03 PROCEDURE — 250N000013 HC RX MED GY IP 250 OP 250 PS 637: Performed by: INTERNAL MEDICINE

## 2025-05-03 PROCEDURE — 97535 SELF CARE MNGMENT TRAINING: CPT | Mod: GO | Performed by: OCCUPATIONAL THERAPIST

## 2025-05-03 PROCEDURE — 84484 ASSAY OF TROPONIN QUANT: CPT | Performed by: HOSPITALIST

## 2025-05-03 PROCEDURE — 82565 ASSAY OF CREATININE: CPT | Performed by: HOSPITALIST

## 2025-05-03 PROCEDURE — 96376 TX/PRO/DX INJ SAME DRUG ADON: CPT

## 2025-05-03 PROCEDURE — G0378 HOSPITAL OBSERVATION PER HR: HCPCS

## 2025-05-03 PROCEDURE — 250N000011 HC RX IP 250 OP 636: Performed by: HOSPITALIST

## 2025-05-03 PROCEDURE — 255N000002 HC RX 255 OP 636: Performed by: HOSPITALIST

## 2025-05-03 PROCEDURE — 120N000001 HC R&B MED SURG/OB

## 2025-05-03 PROCEDURE — 250N000013 HC RX MED GY IP 250 OP 250 PS 637: Performed by: HOSPITALIST

## 2025-05-03 PROCEDURE — 99223 1ST HOSP IP/OBS HIGH 75: CPT | Mod: 25 | Performed by: INTERNAL MEDICINE

## 2025-05-03 PROCEDURE — 83735 ASSAY OF MAGNESIUM: CPT | Performed by: INTERNAL MEDICINE

## 2025-05-03 PROCEDURE — C8929 TTE W OR WO FOL WCON,DOPPLER: HCPCS

## 2025-05-03 PROCEDURE — 85610 PROTHROMBIN TIME: CPT | Performed by: HOSPITALIST

## 2025-05-03 PROCEDURE — 85041 AUTOMATED RBC COUNT: CPT | Performed by: HOSPITALIST

## 2025-05-03 PROCEDURE — 99233 SBSQ HOSP IP/OBS HIGH 50: CPT | Performed by: INTERNAL MEDICINE

## 2025-05-03 PROCEDURE — 97165 OT EVAL LOW COMPLEX 30 MIN: CPT | Mod: GO | Performed by: OCCUPATIONAL THERAPIST

## 2025-05-03 PROCEDURE — 93306 TTE W/DOPPLER COMPLETE: CPT | Mod: 26 | Performed by: INTERNAL MEDICINE

## 2025-05-03 RX ORDER — ATORVASTATIN CALCIUM 20 MG/1
20 TABLET, FILM COATED ORAL ONCE
Status: COMPLETED | OUTPATIENT
Start: 2025-05-03 | End: 2025-05-03

## 2025-05-03 RX ORDER — ASPIRIN 81 MG/1
81 TABLET ORAL EVERY EVENING
Status: DISCONTINUED | OUTPATIENT
Start: 2025-05-03 | End: 2025-05-07 | Stop reason: HOSPADM

## 2025-05-03 RX ORDER — ATORVASTATIN CALCIUM 40 MG/1
40 TABLET, FILM COATED ORAL EVERY EVENING
Status: DISCONTINUED | OUTPATIENT
Start: 2025-05-04 | End: 2025-05-07 | Stop reason: HOSPADM

## 2025-05-03 RX ADMIN — ATORVASTATIN CALCIUM 20 MG: 20 TABLET, FILM COATED ORAL at 08:01

## 2025-05-03 RX ADMIN — ATORVASTATIN CALCIUM 20 MG: 20 TABLET, FILM COATED ORAL at 00:34

## 2025-05-03 RX ADMIN — DIGOXIN 125 MCG: 125 TABLET ORAL at 11:00

## 2025-05-03 RX ADMIN — MICONAZOLE NITRATE: 20 POWDER TOPICAL at 19:57

## 2025-05-03 RX ADMIN — MIDODRINE HYDROCHLORIDE 10 MG: 10 TABLET ORAL at 12:04

## 2025-05-03 RX ADMIN — HUMAN ALBUMIN MICROSPHERES AND PERFLUTREN 3 ML: 10; .22 INJECTION, SOLUTION INTRAVENOUS at 08:51

## 2025-05-03 RX ADMIN — ASPIRIN 81 MG: 81 TABLET, COATED ORAL at 19:56

## 2025-05-03 RX ADMIN — APIXABAN 2.5 MG: 2.5 TABLET, FILM COATED ORAL at 19:56

## 2025-05-03 RX ADMIN — APIXABAN 2.5 MG: 2.5 TABLET, FILM COATED ORAL at 01:09

## 2025-05-03 RX ADMIN — Medication 12.5 MG: at 11:01

## 2025-05-03 RX ADMIN — ATORVASTATIN CALCIUM 20 MG: 20 TABLET, FILM COATED ORAL at 19:56

## 2025-05-03 RX ADMIN — FUROSEMIDE 40 MG: 10 INJECTION, SOLUTION INTRAVENOUS at 08:04

## 2025-05-03 RX ADMIN — PANTOPRAZOLE SODIUM 40 MG: 40 TABLET, DELAYED RELEASE ORAL at 08:01

## 2025-05-03 RX ADMIN — MIDODRINE HYDROCHLORIDE 10 MG: 10 TABLET ORAL at 08:01

## 2025-05-03 RX ADMIN — MIDODRINE HYDROCHLORIDE 10 MG: 10 TABLET ORAL at 17:41

## 2025-05-03 RX ADMIN — APIXABAN 2.5 MG: 2.5 TABLET, FILM COATED ORAL at 08:01

## 2025-05-03 RX ADMIN — FUROSEMIDE 40 MG: 10 INJECTION, SOLUTION INTRAVENOUS at 14:16

## 2025-05-03 ASSESSMENT — ACTIVITIES OF DAILY LIVING (ADL)
ADLS_ACUITY_SCORE: 44
ADLS_ACUITY_SCORE: 48
ADLS_ACUITY_SCORE: 39
ADLS_ACUITY_SCORE: 39
ADLS_ACUITY_SCORE: 44
ADLS_ACUITY_SCORE: 39
ADLS_ACUITY_SCORE: 43
ADLS_ACUITY_SCORE: 48
ADLS_ACUITY_SCORE: 44
ADLS_ACUITY_SCORE: 50
ADLS_ACUITY_SCORE: 48
ADLS_ACUITY_SCORE: 39
ADLS_ACUITY_SCORE: 43
ADLS_ACUITY_SCORE: 44
ADLS_ACUITY_SCORE: 43
ADLS_ACUITY_SCORE: 46
ADLS_ACUITY_SCORE: 50
ADLS_ACUITY_SCORE: 48
ADLS_ACUITY_SCORE: 44
ADLS_ACUITY_SCORE: 48
ADLS_ACUITY_SCORE: 48

## 2025-05-03 NOTE — ED NOTES
[facility]  ED Nurse Handoff Report    ED Chief complaint: Abnormal Labs  . ED Diagnosis:   Final diagnoses:   Elevated brain natriuretic peptide (BNP) level   Elevated troponin   DIEGO (acute kidney injury)   Pleural effusion       Allergies:   Allergies   Allergen Reactions    Scopolamine Unknown       Code Status: unknown    Activity level - Baseline/Home:  independent.  Activity Level - Current:   standby.   Lift room needed: No.   Bariatric: No   Needed: No   Isolation: No.   Infection: Not Applicable.     Respiratory status: Room air    Vital Signs (within 30 minutes):   Vitals:    05/02/25 1721   BP: 100/66   Pulse: 67   Resp: 22   Temp: 97.5  F (36.4  C)   TempSrc: Temporal   SpO2: 96%       Cardiac Rhythm:  ,      Pain level:    Patient confused: No.   Patient Falls Risk: nonskid shoes/slippers when out of bed and patient and family education.   Elimination Status: Has voided     Patient Report - Initial Complaint: Pt presents to ED with shortness of breath. Seen byt his doctor and was told to come in due to elevated BNP     Focused Assessment: A&Ox4. VSS on RA with ear probe. IV lasix given. Adequate urine output.     Abnormal Results:   Labs Ordered and Resulted from Time of ED Arrival to Time of ED Departure   COMPREHENSIVE METABOLIC PANEL - Abnormal       Result Value    Sodium 140      Potassium 4.5      Carbon Dioxide (CO2) 28      Anion Gap 11      Urea Nitrogen 35.2 (*)     Creatinine 1.18 (*)     GFR Estimate 60 (*)     Calcium 9.0      Chloride 101      Glucose 100 (*)     Alkaline Phosphatase 109      AST 24      ALT 9      Protein Total 6.5      Albumin 3.8      Bilirubin Total 0.9     TROPONIN T, HIGH SENSITIVITY - Abnormal    Troponin T, High Sensitivity 26 (*)    NT PROBNP INPATIENT - Abnormal    N terminal Pro BNP Inpatient 27,253 (*)    CBC WITH PLATELETS AND DIFFERENTIAL - Abnormal    WBC Count 7.0      RBC Count 5.34      Hemoglobin 16.3      Hematocrit  49.8      MCV 93      MCH 30.5      MCHC 32.7      RDW 17.2 (*)     Platelet Count 228      % Neutrophils 80      % Lymphocytes 11      % Monocytes 7      % Eosinophils 1      % Basophils 1      % Immature Granulocytes 0      NRBCs per 100 WBC 0      Absolute Neutrophils 5.6      Absolute Lymphocytes 0.8      Absolute Monocytes 0.5      Absolute Eosinophils 0.1      Absolute Basophils 0.1      Absolute Immature Granulocytes 0.0      Absolute NRBCs 0.0     INR - Abnormal    INR 1.48 (*)     PT 17.9 (*)    TROPONIN T, HIGH SENSITIVITY        XR Chest 2 Views   Final Result   IMPRESSION:       Left subclavian approach pacer defibrillator has right atrial appendage and right ventricular leads. The cardiac silhouette is enlarged, however the lower heart borders are largely silhouetted. Ectatic thoracic aorta is unchanged with crescentic    calcification at the arch. Pulmonary arteries at the onofre are prominent but unchanged.      Small pleural effusions and atelectasis in the bases. There are no findings to suggest interstitial or alveolar edema.      Diffuse, flowing mild to moderate thoracic spine degenerative osteophytes.          Treatments provided: see imaging, lab, mar  Family Comments: wife  OBS brochure/video discussed/provided to patient:  Yes  ED Medications:   Medications   furosemide (LASIX) injection 60 mg (60 mg Intravenous $Given 5/2/25 1902)       Drips infusing:  No  For the majority of the shift this patient was Green.   Interventions performed were n/a.    Sepsis treatment initiated: No    Cares/treatment/interventions/medications to be completed following ED care: see in-pt orders    ED Nurse Name: Gris Ramos RN  8:33 PM

## 2025-05-03 NOTE — PLAN OF CARE
PRIMARY DIAGNOSIS: ABNORMAL LABS, SOB, LE EDEMA  OUTPATIENT/OBSERVATION GOALS TO BE MET BEFORE DISCHARGE:  Dyspnea improved and O2 sats >88% at RA or at prior home O2 therapy level: Yes        SpO2: 94 %, O2 Device: None (Room air)  Vitals:    05/02/25 2231   Weight: 71.9 kg (158 lb 8.2 oz)        ECHO and other diagnostic testing complete (if applicable):  ECHO ordered. Serial Trops    Return to near baseline physical activity:  Assist of 1 with RW/GB    Discharge Planner Nurse   Safe discharge environment identified: Yes  Barriers to discharge: Yes       Entered by: Alva Carpenter RN 05/03/2025 12:21 AM   Patient alert and oriented. Vitally stable on room air. Denies pain this evening. Denies shortness of breath/trouble breathing. Up assist of 1 with rolling walker and gait belt. Urinal at bedside- voiding within limits. Cardiac telemetry in place- Afib with BBB overnight. On Sodium restricted/caffeine free diet with 2,000 mL fluid restriction- tolerating. Patient refused CPAP overnight- says he does not wear at home. Peripheral IV saline locked. Patient call appropriately.     Please review provider order for any additional goals.   Nurse to notify provider when observation goals have been met and patient is ready for discharge.      Goal Outcome Evaluation:      Plan of Care Reviewed With: patient    Overall Patient Progress: improvingOverall Patient Progress: improving    Outcome Evaluation: Denies pain. Up assist of 1 with rolling walker. No SOB reported.      Problem: Adult Inpatient Plan of Care  Goal: Plan of Care Review  Description: The Plan of Care Review/Shift note should be completed every shift.  The Outcome Evaluation is a brief statement about your assessment that the patient is improving, declining, or no change.  This information will be displayed automatically on your shiftnote.  Outcome: Progressing  Flowsheets (Taken 5/3/2025 0019)  Outcome Evaluation: Denies pain. Up assist of 1 with rolling  "walker. No SOB reported.  Plan of Care Reviewed With: patient  Overall Patient Progress: improving  Goal: Patient-Specific Goal (Individualized)  Description: You can add care plan individualizations to a care plan. Examples of Individualization might be:  \"Parent requests to be called daily at 9am for status\", \"I have a hard time hearing out of my right ear\", or \"Do not touch me to wake me up as it startlesme\".  Outcome: Progressing  Goal: Absence of Hospital-Acquired Illness or Injury  Outcome: Progressing  Intervention: Identify and Manage Fall Risk  Recent Flowsheet Documentation  Taken 5/2/2025 2230 by Alva Carpenter RN  Safety Promotion/Fall Prevention:   activity supervised   mobility aid in reach   nonskid shoes/slippers when out of bed   patient and family education   safety round/check completed   supervised activity  Intervention: Prevent Skin Injury  Recent Flowsheet Documentation  Taken 5/2/2025 2230 by Alva Carpenter RN  Body Position: position changed independently  Intervention: Prevent and Manage VTE (Venous Thromboembolism) Risk  Recent Flowsheet Documentation  Taken 5/2/2025 2230 by Alva Carpenter RN  VTE Prevention/Management: SCDs off (sequential compression devices)  Intervention: Prevent Infection  Recent Flowsheet Documentation  Taken 5/2/2025 2230 by Alva Carpenter RN  Infection Prevention:   hand hygiene promoted   rest/sleep promoted  Goal: Optimal Comfort and Wellbeing  Outcome: Progressing  Goal: Readiness for Transition of Care  Outcome: Progressing     Problem: Delirium  Goal: Optimal Coping  Outcome: Progressing  Goal: Improved Behavioral Control  Outcome: Progressing  Intervention: Minimize Safety Risk  Recent Flowsheet Documentation  Taken 5/2/2025 2230 by Alva Carpenter RN  Enhanced Safety Measures: pain management  Goal: Improved Attention and Thought Clarity  Outcome: Progressing  Goal: Improved Sleep  Outcome: Progressing     "

## 2025-05-03 NOTE — CONSULTS
[facility]    Cardiology Consultation     Date of Admission:  5/2/2025    Assessment & Plan   Eric Poole is a 84 year old male who was admitted on 5/2/2025.    Acute on chronic decompensated HFrEF, End stage  GDMT complicated by hypotension with initiation of ACEI, BB or ARB,  UTI with SGLT2,  received IV lasix with symptomatic improvement.  Recommend [facility] clinic follow-up, start inpt eval and continue IV diuresis.  Would avoid using digoxin due to CKD likely cardiorenal, if AF with RVR recurs consider AVNA.  AFib- on coumadin for CVA prophylaxis  CAD- no symptoms of CP, trops flat consistent with type II MI  Hypotension- takes midodrine TID, consider weaning since off all HF meds that lower BP.  COPD- previous cardiology notes say he is O2 dependent. May benefit from chronic O2 supplementation.  High complexity     Antonieta Liliana Armenta DO, MD    Primary Care Physician   [facility]    Reason for Consult   Reason for consult: I was asked by Shilpa to evaluate this patient for HF.    History of Present Illness   Eric Poole is a 84 year old male who presents with  w/PMH chronic systolic heart failure w/ LVEF 20%, mild AVR and pacemaker/ICD, pulmonary hypertension w/ severe LETICIA , moderate COPD, PAF on coumadin, hx secondary erythrocytosis (s/p therapeutic phlebotomies), BPH, chronic hypotension, overactive bladder who presents after having abnormal labs with sob.  He and his wife recently moved into [facility] assisted living facility.  They had been traveling back and forth to [place] for the winter, but sold their trailer this year and moved out of their home into assisted living.  He has been in and out of the hospital frequently with heart failure exacerbations.  He presented to ED today and notes increased sob, exertional dyspnea, LE edema but denies any orthopnea, cough, fever, CP or other complaints.     In ED was noted to have BNP 27K and was given 60 IV lasix  x1 with good response. Trop minimally elevated at 26, EKG with afib but rate controlled. OF note was admitted down in [place] back in march for CHF and had some meds adjusted at that time due to hypotension, DIEGO, CHF.    Past Medical History   Past Medical History:   Diagnosis Date    BPH (benign prostatic hypertrophy) 5/14/2013    GERD (gastroesophageal reflux disease) 5/14/2013    Hypertension goal BP (blood pressure) < 140/90     Malignant neoplasm of prostate (H) 5/11/2011         Past Surgical History   Past Surgical History:   Procedure Laterality Date    CATARACT EXTRACTION Bilateral     ELBOW SURGERY  1995    left    ELBOW SURGERY Left     EYE SURGERY  2012    bilateral cataract removal    KNEE SURGERY  2001    arthroscopic meniscal repair on left     KNEE SURGERY  1960    right knee fracture and repair    KNEE SURGERY  2008    right knee TKA    TOTAL KNEE ARTHROPLASTY Right     TOTAL KNEE ARTHROPLASTY Left 07/17/2018    wisdom teeth removal  age 39         Prior to Admission Medications   Prior to Admission Medications   Prescriptions Last Dose Informant Patient Reported? Taking?   Vitamin D3 (CHOLECALCIFEROL) 25 mcg (1000 units) tablet 5/1/2025 Evening  Yes Yes   Sig: Take 25 mcg by mouth every evening.   apixaban ANTICOAGULANT (ELIQUIS) 2.5 MG tablet 5/2/2025 Morning  Yes Yes   Sig: Take 2.5 mg by mouth 2 times daily.   aspirin 81 MG EC tablet 5/1/2025 Evening  Yes Yes   Sig: Take 81 mg by mouth every evening.   atorvastatin (LIPITOR) 40 MG tablet 5/1/2025 Evening  Yes Yes   Sig: Take 40 mg by mouth every evening.   digoxin (LANOXIN) 125 MCG tablet 5/2/2025 Morning  Yes Yes   Sig: Take 125 mcg by mouth daily.   furosemide (LASIX) 40 MG tablet 5/2/2025 Morning  Yes Yes   Sig: Take 40 mg by mouth daily.   meloxicam (MOBIC) 7.5 MG tablet 5/2/2025 Morning  Yes Yes   Sig: Take 7.5 mg by mouth daily.   midodrine (PROAMATINE) 10 MG tablet 5/2/2025 Morning  Yes Yes   Sig: Take 10 mg by mouth 3 times daily.    omeprazole (PRILOSEC) 20 MG capsule 5/1/2025 Evening  No Yes   Sig: Take 1 capsule (20 mg) by mouth daily   spironolactone (ALDACTONE) 12.5 mg TABS half-tab 5/2/2025 Morning  Yes Yes   Sig: Take 12.5 mg by mouth daily.      Facility-Administered Medications: None     Current Facility-Administered Medications   Medication Dose Route Frequency Provider Last Rate Last Admin    apixaban ANTICOAGULANT (ELIQUIS) tablet 2.5 mg  2.5 mg Oral BID Wang Moeller MD   2.5 mg at 05/03/25 0801    aspirin EC tablet 81 mg  81 mg Oral QPM Marilyn Montez MD        atorvastatin (LIPITOR) tablet 20 mg  20 mg Oral Once Bentley Henry DO        [START ON 5/4/2025] atorvastatin (LIPITOR) tablet 40 mg  40 mg Oral QPM Marilyn Montez MD        Continuing ACE inhibitor/ARB/ARNI from home medication list OR ACE inhibitor/ARB/ARNI order already placed during this visit   Does not apply DOES NOT GO TO MAR Bentley Henry DO        Continuing beta blocker from home medication list OR beta blocker order already placed during this visit   Does not apply DOES NOT GO TO MAR Bentley Henry DO        digoxin (LANOXIN) tablet 125 mcg  125 mcg Oral Daily Daija Henrynt A, DO   125 mcg at 05/03/25 1100    furosemide (LASIX) injection 40 mg  40 mg Intravenous bid 08 & 14 Daija Henrynt A, DO   40 mg at 05/03/25 0804    midodrine (PROAMATINE) tablet 10 mg  10 mg Oral TID w/meals Carl Bentley A, DO   10 mg at 05/03/25 0801    ondansetron (ZOFRAN ODT) ODT tab 4 mg  4 mg Oral Q6H PRN Daija Henrynt A, DO        Or    ondansetron (ZOFRAN) injection 4 mg  4 mg Intravenous Q6H PRN Daija Henrynt A, DO        pantoprazole (PROTONIX) EC tablet 40 mg  40 mg Oral Daily Daija Henrynt A, DO   40 mg at 05/03/25 0801    prochlorperazine (COMPAZINE) injection 5 mg  5 mg Intravenous Q6H PRN Carl, Bentley A, DO        Or    prochlorperazine (COMPAZINE) tablet 5 mg  5 mg Oral Q6H PRN Bentley Henry,         senna-docusate (SENOKOT-S/PERICOLACE)  8.6-50 MG per tablet 1 tablet  1 tablet Oral BID PRN Carl, Bentley A, DO        Or    senna-docusate (SENOKOT-S/PERICOLACE) 8.6-50 MG per tablet 2 tablet  2 tablet Oral BID PRN Carl, Bentley A, DO        spironolactone (ALDACTONE) half-tab 12.5 mg  12.5 mg Oral Daily Carl, Bentley A, DO   12.5 mg at 05/03/25 1101     Current Facility-Administered Medications   Medication Dose Route Frequency Provider Last Rate Last Admin    apixaban ANTICOAGULANT (ELIQUIS) tablet 2.5 mg  2.5 mg Oral BID Wang Moeller MD   2.5 mg at 05/03/25 0801    aspirin EC tablet 81 mg  81 mg Oral QPM Marilyn Montez MD        atorvastatin (LIPITOR) tablet 20 mg  20 mg Oral Once Daija Henrynt A, DO        [START ON 5/4/2025] atorvastatin (LIPITOR) tablet 40 mg  40 mg Oral QPM Marilyn Montez MD        Continuing ACE inhibitor/ARB/ARNI from home medication list OR ACE inhibitor/ARB/ARNI order already placed during this visit   Does not apply DOES NOT GO TO MAR Bentley Henry A DO        Continuing beta blocker from home medication list OR beta blocker order already placed during this visit   Does not apply DOES NOT GO TO MAR Daija Henrynt A, DO        digoxin (LANOXIN) tablet 125 mcg  125 mcg Oral Daily Carl, Bentley A, DO   125 mcg at 05/03/25 1100    furosemide (LASIX) injection 40 mg  40 mg Intravenous bid 08 & 14 Carl, Bentley A, DO   40 mg at 05/03/25 0804    midodrine (PROAMATINE) tablet 10 mg  10 mg Oral TID w/meals Carl, Bentley A, DO   10 mg at 05/03/25 0801    ondansetron (ZOFRAN ODT) ODT tab 4 mg  4 mg Oral Q6H PRN Carl, Bentley A, DO        Or    ondansetron (ZOFRAN) injection 4 mg  4 mg Intravenous Q6H PRN Carl, Bentley A, DO        pantoprazole (PROTONIX) EC tablet 40 mg  40 mg Oral Daily Carl, Bentley A, DO   40 mg at 05/03/25 0801    prochlorperazine (COMPAZINE) injection 5 mg  5 mg Intravenous Q6H PRN Bentley Henry DO        Or    prochlorperazine (COMPAZINE) tablet 5 mg  5 mg Oral Q6H PRN Bentley Henry,  DO        senna-docusate (SENOKOT-S/PERICOLACE) 8.6-50 MG per tablet 1 tablet  1 tablet Oral BID PRN Carl, Bentley A, DO        Or    senna-docusate (SENOKOT-S/PERICOLACE) 8.6-50 MG per tablet 2 tablet  2 tablet Oral BID PRN Carl, Bentley A, DO        spironolactone (ALDACTONE) half-tab 12.5 mg  12.5 mg Oral Daily Carl, Bentley A, DO   12.5 mg at 05/03/25 1101     Allergies   No Active Allergies    Social History    reports that he quit smoking about 17 years ago. His smoking use included cigarettes. He started smoking about 68 years ago. He has a 25 pack-year smoking history. He has never used smokeless tobacco. He reports that he does not drink alcohol and does not use drugs.      Family History   I have reviewed this patient's family history and updated it with pertinent information if needed.  Family History   Problem Relation Age of Onset    Hypertension Mother     Arthritis Mother         osteoarthritis    Cardiovascular Mother         MI at 93 years old    Hypertension Father     Cardiovascular Father         MI at 77 years old    Arthritis Paternal Uncle         osteoarthritis    Arthritis Paternal Uncle         osteoarthritis    Arthritis Paternal Uncle         osteoarthritis    Arthritis Paternal Aunt         osteoarthritis    Arthritis Paternal Aunt         osteoarthritis    Osteoarthritis Mother     Acute Myocardial Infarction Father           Review of Systems   A comprehensive review of system was performed and is negative other than that noted in the HPI or here.     Physical Exam   Vital Signs with Ranges  Temp:  [97.3  F (36.3  C)-97.6  F (36.4  C)] 97.4  F (36.3  C)  Pulse:  [54-76] 56  Resp:  [12-29] 18  BP: (100-123)/(60-83) 111/60  SpO2:  [93 %-100 %] 94 %  Wt Readings from Last 4 Encounters:   05/03/25 72.6 kg (160 lb)   06/04/19 96.6 kg (213 lb)   08/01/18 99.8 kg (220 lb)   10/20/15 103.4 kg (228 lb)     I/O last 3 completed shifts:  In: 480 [P.O.:480]  Out: 575 [Urine:575]      Vitals: BP  "111/60 (BP Location: Left arm, Patient Position: Semi-Regalado's, Cuff Size: Adult Regular)   Pulse 56   Temp 97.4  F (36.3  C) (Oral)   Resp 18   Ht 1.803 m (5' 11\")   Wt 72.6 kg (160 lb)   SpO2 94%   BMI 22.32 kg/m      Physical Exam:   General - Alert and oriented to time place and person in no acute distress  Eyes - No scleral icterus  HEENT - Neck supple, moist mucous membranes  Cardiovascular - regular (paced), no murmur  Extremities - There is moderate peripheral edema with stasis changes  Respiratory - clear anteriorly  Skin - No pallor or cyanosis  Gastrointestinal - Non tender and non distended without rebound or guarding  Psych - Appropriate affect   Neurological - No gross motor neurological focal deficits    No lab results found in last 7 days.    Invalid input(s): \"TROPONINIES\"    Recent Labs   Lab 05/03/25 0516 05/02/25  2337 05/02/25  1847 05/02/25  1825   WBC 6.5  --   --  7.0   HGB 14.8  --   --  16.3   MCV 92  --   --  93     --   --  228   INR 1.67* 1.59* 1.48*  --      --   --  140   POTASSIUM 4.3  --   --  4.5   CHLORIDE 102  --   --  101   CO2 28  --   --  28   BUN 30.8*  --   --  35.2*   CR 1.23*  --   --  1.18*   GFRESTIMATED 58*  --   --  60*   ANIONGAP 10  --   --  11   ESTEFANÍA 8.7*  --   --  9.0   GLC 92  --   --  100*   ALBUMIN  --   --   --  3.8   PROTTOTAL  --   --   --  6.5   BILITOTAL  --   --   --  0.9   ALKPHOS  --   --   --  109   ALT  --   --   --  9   AST  --   --   --  24     No results for input(s): \"CHOL\", \"HDL\", \"LDL\", \"TRIG\", \"CHOLHDLRATIO\" in the last 24167 hours.  Recent Labs   Lab 05/03/25 0516 05/02/25  1825   WBC 6.5 7.0   HGB 14.8 16.3   HCT 45.2 49.8   MCV 92 93    228     No results for input(s): \"PH\", \"PHV\", \"PO2\", \"PO2V\", \"SAT\", \"PCO2\", \"PCO2V\", \"HCO3\", \"HCO3V\" in the last 168 hours.  Recent Labs   Lab 05/02/25  1825   NTBNPI 27,253*     No results for input(s): \"DD\" in the last 168 hours.  No results for input(s): \"SED\", \"CRP\" in the last " "168 hours.  Recent Labs   Lab 05/03/25  0516 05/02/25  1825    228     No results for input(s): \"TSH\" in the last 168 hours.  No results for input(s): \"COLOR\", \"APPEARANCE\", \"URINEGLC\", \"URINEBILI\", \"URINEKETONE\", \"SG\", \"UBLD\", \"URINEPH\", \"PROTEIN\", \"UROBILINOGEN\", \"NITRITE\", \"LEUKEST\", \"RBCU\", \"WBCU\" in the last 168 hours.    Imaging:  Recent Results (from the past 48 hours)   XR Chest 2 Views    Narrative    For Patients: As a result of the 21st Century Cures Act, medical imaging exams and procedure reports are released immediately into your electronic medical record. You may view this report before your referring provider. If you have questions, please contact your health care provider.    EXAM: XR CHEST 2 VIEWS PA AND LATERAL  [place] [facility] [place]  DATE: 5/1/2025    INDICATION: Dyspnea, Unspecified Type  COMPARISON: 6/12/2019    Impression    Left pacemaker. Heart mildly enlarged, unchanged. Small bilateral effusions with bibasilar atelectasis.   XR Chest 2 Views    Narrative    EXAM: XR CHEST 2 VIEWS  [facility]  DATE: 5/2/2025    INDICATION: elev bnp  COMPARISON: Frontal and lateral views of the chest 5/1/2025      Impression    IMPRESSION:     Left subclavian approach pacer defibrillator has right atrial appendage and right ventricular leads. The cardiac silhouette is enlarged, however the lower heart borders are largely silhouetted. Ectatic thoracic aorta is unchanged with crescentic   calcification at the arch. Pulmonary arteries at the onofre are prominent but unchanged.    Small pleural effusions and atelectasis in the bases. There are no findings to suggest interstitial or alveolar edema.    Diffuse, flowing mild to moderate thoracic spine degenerative osteophytes.   Echocardiogram Complete   Result Value    LVEF  15-20%    Narrative    [id number]  [id number]  [id number]  [id number]^ARNULFO^JENNIFER^A     [facility]  Echocardiography Laboratory  [address] [place] [address]     Name: IJEOMA SPENCER  MRN: [id number]  : 1940  Study Date: 2025 08:17 AM  Age: 84 yrs  Gender: Male  Patient Location: [facility]  Reason For Study: Heart Failure  Ordering Physician: JENNIFER ARREDONDO  Performed By: Christine Guy     BSA: 1.9 m2  Height: 71 in  Weight: 160 lb  HR: 61  BP: 109/68 mmHg  ______________________________________________________________________________  Procedure  Echocardiogram with two-dimensional, color and spectral Doppler. Optison (NDC  #[id number]) given intravenously.  ______________________________________________________________________________  Interpretation Summary     The visual ejection fraction is 15-20%.  There is severe global hypokinesia of the left ventricle.  The left ventricle is severely dilated.  There is a catheter/pacemaker lead seen in the right ventricle.  There is moderate (2+) mitral regurgitation.  There is mild (1+) tricuspid regurgitation.  There is mild (1+) aortic regurgitation.  There is no pericardial effusion.  Moderate left pleural effusion     Outside echo dated 25 with EF < 20%  ______________________________________________________________________________  Left Ventricle  The left ventricle is severely dilated. There is normal left ventricular wall  thickness. The visual ejection fraction is 15-20%. There is severe global  hypokinesia of the left ventricle.     Right Ventricle  The right ventricle is normal in structure, function and size. There is a  catheter/pacemaker lead seen in the right ventricle.     Atria  There is mod-severe biatrial enlargement.     Mitral Valve  There is moderate (2+) mitral regurgitation.     Tricuspid Valve  There is mild (1+) tricuspid regurgitation.     Aortic Valve  There is mild (1+) aortic regurgitation.     Pulmonic Valve  The pulmonic valve is not well visualized.     Vessels  Normal size aorta. Mildly dilated proximal aorta. The inferior vena cava  is  normal.     Pericardium  There is no pericardial effusion. Moderate left pleural effusion.     ______________________________________________________________________________  MMode/2D Measurements & Calculations  IVSd: 1.0 cm  LVIDd: 8.0 cm  LVIDs: 7.4 cm  LVPWd: 1.1 cm  IVC diam: 2.0 cm  FS: 7.1 %     LV mass(C)d: 418.9 grams  LV mass(C)dI: 218.4 grams/m2  Ao root diam: 4.4 cm  asc Aorta Diam: 4.1 cm  LVOT diam: 2.4 cm  LVOT area: 4.6 cm2  Ao root diam index Ht(cm/m): 2.4  Ao root diam index BSA (cm/m2): 2.3  Asc Ao diam index BSA (cm/m2): 2.1  Asc Ao diam index Ht(cm/m): 2.3  LA Volume (BP): 112.0 ml  LA Volume Index (BP): 58.3 ml/m2     RV Base: 4.5 cm  RWT: 0.26  TAPSE: 2.0 cm     Doppler Measurements & Calculations  MV E max vincenzo: 70.7 cm/sec  MV dec time: 0.14 sec  Ao V2 max: 182.4 cm/sec  Ao max P.0 mmHg  Ao V2 mean: 128.0 cm/sec  Ao mean P.3 mmHg  Ao V2 VTI: 30.5 cm  ALTA(I,D): 1.9 cm2  ALTA(V,D): 1.8 cm2  AI P1/2t: 754.7 msec  LV V1 max P.1 mmHg  LV V1 max: 72.7 cm/sec  LV V1 VTI: 12.4 cm  MR PISA: 1.4 cm2  MR ERO: 0.11 cm2  MR volume: 17.7 ml  SV(LVOT): 57.3 ml  SI(LVOT): 29.8 ml/m2  PA acc time: 0.09 sec  TR max vincenzo: 277.2 cm/sec  TR max P.8 mmHg  AV Vincenzo Ratio (DI): 0.40  ALTA Index (cm2/m2): 0.98     RV S Vincenzo: 11.4 cm/sec     ______________________________________________________________________________  Report approved by: SURAJ Cuevas on 2025 09:40 AM             Echo:  No results found for this or any previous visit (from the past 4320 hours).    Clinically Significant Risk Factors Present on Admission                # Drug Induced Coagulation Defect: home medication list includes an anticoagulant medication  # Drug Induced Platelet Defect: home medication list includes an antiplatelet medication   # Hypertension: Noted on problem list  # Acute heart failure with reduced ejection fraction: last echo with EF <40% and receiving IV diuretics                     Systolic                    CKD POA List: Stage 3b (GFR 30-44)        COPD    Pulmonary Heart Disease (Pulmonary hypertension or Cor pulmonale): Pulmonary Hypertension, unspecified

## 2025-05-03 NOTE — PROGRESS NOTES
ROOM # 212-2    Living Situation (if not independent, order SW consult):  Facility name: Home with wife  : Wife Garima lucero     Activity level at baseline: Independent with walker  Activity level on admit: Ax1 with rolling walker    Who will be transporting you at discharge: Family    Patient registered to observation; given Patient Bill of Rights; given the opportunity to ask questions about observation status and their plan of care.  Patient has been oriented to the observation room, bathroom and call light is in place.    Discussed discharge goals and expectations with patient/family.

## 2025-05-03 NOTE — PLAN OF CARE
PRIMARY DIAGNOSIS: ABNORMAL LABS, SOB, LE EDEMA  OUTPATIENT/OBSERVATION GOALS TO BE MET BEFORE DISCHARGE:  Dyspnea improved and O2 sats >88% at RA or at prior home O2 therapy level: Yes        SpO2: 94 %, O2 Device: None (Room air)  Vitals:    05/02/25 2231   Weight: 71.9 kg (158 lb 8.2 oz)        ECHO and other diagnostic testing complete (if applicable):  ECHO ordered. Serial Trops    Return to near baseline physical activity:  Assist of 1 with RW/GB    Discharge Planner Nurse   Safe discharge environment identified: Yes  Barriers to discharge: Yes       Entered by: Alva Carpenter RN 05/03/2025 4:03 AM   Patient alert and oriented. Vitally stable on room air. Denies pain this evening. Denies shortness of breath/trouble breathing. Up assist of 1 with rolling walker and gait belt. Urinal at bedside- patient with urinary urgency overnight. Bladder scanned for 345 mL- patient denies pain/feeling uncomfortable. Cardiac telemetry in place- Afib with BBB overnight. On Sodium restricted/caffeine free diet with 2,000 mL fluid restriction- tolerating. Patient refused CPAP overnight- says he does not wear at home. Peripheral IV saline locked. Serial Trops Q6H. Patient calls appropriately.     Please review provider order for any additional goals.   Nurse to notify provider when observation goals have been met and patient is ready for discharge.      Goal Outcome Evaluation:      Plan of Care Reviewed With: patient    Overall Patient Progress: improvingOverall Patient Progress: improving    Outcome Evaluation: Denies pain. Up assist of 1 with rolling walker. No SOB reported.      Problem: Adult Inpatient Plan of Care  Goal: Plan of Care Review  Description: The Plan of Care Review/Shift note should be completed every shift.  The Outcome Evaluation is a brief statement about your assessment that the patient is improving, declining, or no change.  This information will be displayed automatically on your shiftnote.  Outcome:  "Progressing  Flowsheets (Taken 5/3/2025 0019)  Outcome Evaluation: Denies pain. Up assist of 1 with rolling walker. No SOB reported.  Plan of Care Reviewed With: patient  Overall Patient Progress: improving  Goal: Patient-Specific Goal (Individualized)  Description: You can add care plan individualizations to a care plan. Examples of Individualization might be:  \"Parent requests to be called daily at 9am for status\", \"I have a hard time hearing out of my right ear\", or \"Do not touch me to wake me up as it startlesme\".  Outcome: Progressing  Goal: Absence of Hospital-Acquired Illness or Injury  Outcome: Progressing  Intervention: Identify and Manage Fall Risk  Recent Flowsheet Documentation  Taken 5/2/2025 2230 by Alva Carpenter RN  Safety Promotion/Fall Prevention:   activity supervised   mobility aid in reach   nonskid shoes/slippers when out of bed   patient and family education   safety round/check completed   supervised activity  Intervention: Prevent Skin Injury  Recent Flowsheet Documentation  Taken 5/2/2025 2230 by Alva Carpenter RN  Body Position: position changed independently  Intervention: Prevent and Manage VTE (Venous Thromboembolism) Risk  Recent Flowsheet Documentation  Taken 5/2/2025 2230 by Alva Carpenter RN  VTE Prevention/Management: SCDs off (sequential compression devices)  Intervention: Prevent Infection  Recent Flowsheet Documentation  Taken 5/2/2025 2230 by Alva Carpenter RN  Infection Prevention:   hand hygiene promoted   rest/sleep promoted  Goal: Optimal Comfort and Wellbeing  Outcome: Progressing  Goal: Readiness for Transition of Care  Outcome: Progressing     Problem: Delirium  Goal: Optimal Coping  Outcome: Progressing  Goal: Improved Behavioral Control  Outcome: Progressing  Intervention: Minimize Safety Risk  Recent Flowsheet Documentation  Taken 5/2/2025 2230 by Alva Carpenter RN  Enhanced Safety Measures: pain management  Goal: Improved Attention and Thought Clarity  Outcome: " Progressing  Goal: Improved Sleep  Outcome: Progressing

## 2025-05-03 NOTE — PHARMACY-ADMISSION MEDICATION HISTORY
Pharmacist Admission Medication History    Admission medication history is complete. The information provided in this note is only as accurate as the sources available at the time of the update.    Information Source(s): Patient, Family member, and [place]   via in-person, phone, and [phone]    Pertinent Information: outside meds    Changes made to PTA medication list:  Added: spironolactone, eliquis, asa, vitamin d3, mobic, midodrine, digoxin, lasix  Deleted: cardura, allegra, proscar, toprol, MVI, warfarin  Changed: lipitor    Allergies reviewed with patient and updates made in EHR: yes --patient request scopolamine to be removed since no longer allergic    Medication History Completed By: Dallas Desir RPH 5/2/2025 11:58 PM    PTA Med List   Medication Sig Last Dose/Taking    apixaban ANTICOAGULANT (ELIQUIS) 2.5 MG tablet Take 2.5 mg by mouth 2 times daily. 5/2/2025 Morning    aspirin 81 MG EC tablet Take 81 mg by mouth every evening. 5/1/2025 Evening    atorvastatin (LIPITOR) 40 MG tablet Take 40 mg by mouth every evening. 5/1/2025 Evening    digoxin (LANOXIN) 125 MCG tablet Take 125 mcg by mouth daily. 5/2/2025 Morning    furosemide (LASIX) 40 MG tablet Take 40 mg by mouth daily. 5/2/2025 Morning    meloxicam (MOBIC) 7.5 MG tablet Take 7.5 mg by mouth daily. 5/2/2025 Morning    midodrine (PROAMATINE) 10 MG tablet Take 10 mg by mouth 3 times daily. 5/2/2025 Morning    omeprazole (PRILOSEC) 20 MG capsule Take 1 capsule (20 mg) by mouth daily 5/1/2025 Evening    spironolactone (ALDACTONE) 12.5 mg TABS half-tab Take 12.5 mg by mouth daily. 5/2/2025 Morning    Vitamin D3 (CHOLECALCIFEROL) 25 mcg (1000 units) tablet Take 25 mcg by mouth every evening. 5/1/2025 Evening

## 2025-05-03 NOTE — PROGRESS NOTES
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  Have decided to move year round in [place] to be closer to family   General Information   Onset of Illness/Injury or Date of Surgery 05/02/25   Referring Physician Bentley Henry   Patient/Family Therapy Goal Statement (OT) get stronger and return to [facility]   Additional Occupational Profile Info/Pertinent History of Current Problem Eric Poole is a 84 year old man who was admitted on 5/2/2025. PMH significant for chronic systolic heart failure w/ LVEF 20%, mild AVR and pacemaker/ICD, pulmonary hypertension w/ severe LETICIA , moderate COPD, PAF on Eliquis, hx secondary erythrocytosis (s/p therapeutic phlebotomies), BPH, chronic hypotension, and overactive bladder.  Patient presented for evaluation due to shortness of breath and report of abnormal lab (BNP markedly elevated).  Patient was establishing with new provider in [place] and pending outpatient cardiology referral as he had previously followed in [place].  On presentation the emergency department, patient was noting increased shortness of breath, exertional dyspnea, and  lower extremity edema.  Denied any chest pain.  In ED was noted to have BNP 27K and was given 60 IV lasix x1 with good response. Trop minimally elevated at 26, EKG with afib but rate controlled. Of note, patient was admitted in [place] in March 2025 for heart failure exacerbation.  He had medications adjusted at that time due to hypotension, DIEGO, CHF.   Performance Patterns (Routines, Roles, Habits) pt and spouse are pleased that they are in [place].  Reporting that they had allot of stress and issues with medical care in [place]   Existing Precautions/Restrictions fall   General Observations and Info pt in bed, willing to participate.  Spouse present during sesison, correcting pt on history occassionally   Cognitive Status Examination   Orientation Status orientation to person, place and time   Cognitive Status Comments pt was a little vague on his history but no major issues noted   Visual Perception   Visual Impairment/Limitations corrective lenses full-time   Pain Assessment   Patient Currently in Pain Yes, see Vital Sign flowsheet   Range of Motion Comprehensive   General Range of Motion bilateral upper extremity ROM WFL   Strength Comprehensive (MMT)   General Manual Muscle Testing (MMT) Assessment upper extremity strength deficits identified;no strength deficits identified   Comment, General Manual Muscle Testing (MMT) Assessment pt has some endurance issues   Coordination   Upper Extremity Coordination No deficits were identified   Bed Mobility   Bed Mobility supine-sit   Supine-Sit [place] (Bed Mobility) supervision;verbal cues;contact guard   Assistive Device (Bed Mobility) bed rails   Clinical Impression   Criteria for Skilled Therapeutic Interventions Met (OT) Yes, treatment indicated   OT Diagnosis decreased independence and endurance in ADLS and IADLS   OT Problem List-Impairments impacting ADL problems related to;activity tolerance impaired;strength   Assessment of Occupational Performance 1-3 Performance  Deficits   Identified Performance Deficits decreased endurance for leisure skills and home management tasks,   Planned Therapy Interventions (OT) ADL retraining;strengthening;home program guidelines;progressive activity/exercise   Clinical Decision Making Complexity (OT) problem focused assessment/low complexity   Risk & Benefits of therapy have been explained evaluation/treatment results reviewed;care plan/treatment goals reviewed;patient;spouse/significant other   OT Total Evaluation Time   OT Eval, Low Complexity Minutes (59813) 15   OT Goals   Therapy Frequency (OT) Daily   OT Predicted Duration/Target Date for Goal Attainment 05/06/25   OT Goals OT Goal 1;OT Goal 2;OT Goal 3   OT: Goal 1 Pt will tolerate 10+ minutes for ADLs tasks to increase endurance for  ADLS at home   OT: Goal 2 Pt will identify 3 EC techniques for daily home activities to increase endurance for ADLS   OT: Goal 3 Pt will tolerate 10+ minutes aeorbic activity to increase endurance in ADLS and IADLS   Interventions   Interventions Quick Adds Self-Care/Home Management   Self-Care/Home Management   Self-Care/Home Mgmt/ADL, Compensatory, Meal Prep Minutes (53213) 25   Symptoms Noted During/After Treatment (Meal Preparation/Planning Training) fatigue   Treatment Detail/Skilled Intervention OT: Pt in bed, willing to participate.  Spouse present during session.  Worked with pt and spouse on general education on CHF topics including low sodium diet and awareness of managing his weight.  Pt stated he used to weigh himself but fell out of the habit.  Has a scale he can use.  Was generally aware that he needed to cut down salt intake, provided eduation on suggestions and food choices to minimize his intake of salt.  Also had pt complete 5 minutes of ambulation in the room with his walker.  Pt did well overall, no LOB.  Some mild SOB with activity.   OT Discharge Planning   OT Plan Endurance ADLS for dressing and grooming, ambulation in [place].  Monitor  O2 sats with ambulation.  Try to find CHF education handouts to provide resources for lifestyle changes at home   OT Discharge Recommendation (DC Rec) home with assist;home with home care occupational therapy   OT Rationale for DC Rec Pt demonstrates some mild to moderate deconditioning for daily activities at home.  Has heard some things about CHF education in the past, would benefit from further review and resources to allow him to follow diet and activity levels at home.  Will see in IP OT to increase endurance and awareness of  lifestyle changes around CHF. Anticipate pt will improve and gain endurance for daily cares, be able to return to his detention with support from his spouse, support from the [facility].   OT Brief overview of current status Goals of therapy will be to address safe mobility and ADLS and make recommendations for discharge to the next level of care.  Pt and RN will continue to follow all fall risk precautions as documented by RN staff while hospitalized.   OT Total Distance Amb During Session (feet) 20   Total Session Time   Timed Code Treatment Minutes 25   Total Session Time (sum of timed and untimed services) 40

## 2025-05-03 NOTE — PROGRESS NOTES
0952 - 5 Beats of Martín Guajardo MD notified verbally      0953 - 6 Beats of Martín Guajardo MD aware

## 2025-05-03 NOTE — PLAN OF CARE
"Inpatient 0700 - 1900   CONGESTIVE HEART FAILURE  OUTPATIENT/OBSERVATION GOALS TO BE MET BEFORE DISCHARGE:  Dyspnea improved and O2 sats >88% at RA or at prior home O2 therapy level: Yes        SpO2: 93 %, O2 Device: None (Room air)  Vitals:    05/02/25 2231 05/03/25 0314   Weight: 71.9 kg (158 lb 8.2 oz) 72.6 kg (160 lb)        ECHO and other diagnostic testing complete (if applicable): Yes    Return to near baseline physical activity: Yes    I&Os this Shift:    I: 1000 mL  O: 1250 mL     Discharge Planner Nurse   Safe discharge environment identified: Yes  Barriers to discharge: Yes       Entered by: Yao Méndez RN 05/03/2025 8:17 AM       Patient is A&Ox4. His VS are stable on room air - monitoring of pulse oxymetry. Has a right PIV - tends to bleed with medication history and dressing changed to PIV and functioning well. Has denied pain this shift.       Heart sounds are distant and difficult to auscultate - lungs clear to diminished. Still has Edema in BLE - reports some dyspnea upon exertion. Infrequent cough present.     On cardiac and 2000 mL fluid restricted diet - has been having difficulties urinating today - obtained order for [place] - placed without difficulties. Denies any issues with BM's.     Moving SBA with walker and gait belt.     Cardiology following - Echo today with EF of 15-20%.       /68 (BP Location: Left arm)   Pulse 61   Temp 97.4  F (36.3  C) (Oral)   Resp 20   Ht 1.803 m (5' 11\")   Wt 72.6 kg (160 lb)   SpO2 92%   BMI 22.32 kg/m       Please review provider order for any additional goals.   Nurse to notify provider when observation goals have been met and patient is ready for discharge.  Problem: Adult Inpatient Plan of Care  Goal: Plan of Care Review  Description: The Plan of Care Review/Shift note should be completed every shift.  The Outcome Evaluation is a brief statement about your assessment that the patient is improving, declining, or no change.  This information " "will be displayed automatically on your shiftnote.  Outcome: Progressing  Flowsheets (Taken 5/3/2025 0816)  Outcome Evaluation: Diuresis  Plan of Care Reviewed With: patient  Overall Patient Progress: no change  Goal: Patient-Specific Goal (Individualized)  Description: You can add care plan individualizations to a care plan. Examples of Individualization might be:  \"Parent requests to be called daily at 9am for status\", \"I have a hard time hearing out of my right ear\", or \"Do not touch me to wake me up as it startlesme\".  Outcome: Progressing  Goal: Absence of Hospital-Acquired Illness or Injury  Outcome: Progressing  Intervention: Identify and Manage Fall Risk  Recent Flowsheet Documentation  Taken 5/3/2025 0745 by Yao Méndez RN  Safety Promotion/Fall Prevention:   activity supervised   assistive device/personal items within reach   clutter free environment maintained   lighting adjusted   mobility aid in reach   nonskid shoes/slippers when out of bed   patient and family education   room near nurse's station   room organization consistent   safety round/check completed   supervised activity  Intervention: Prevent Skin Injury  Recent Flowsheet Documentation  Taken 5/3/2025 0745 by Yao Méndez RN  Body Position: position changed independently  Intervention: Prevent and Manage VTE (Venous Thromboembolism) Risk  Recent Flowsheet Documentation  Taken 5/3/2025 0745 by Yao Méndez RN  VTE Prevention/Management: SCDs off (sequential compression devices)  Goal: Optimal Comfort and Wellbeing  Outcome: Progressing  Intervention: Monitor Pain and Promote Comfort  Recent Flowsheet Documentation  Taken 5/3/2025 0748 by Yao Méndez RN  Pain Management Interventions: declines  Goal: Readiness for Transition of Care  Outcome: Progressing     Problem: Delirium  Goal: Optimal Coping  Outcome: Progressing  Goal: Improved Behavioral Control  Outcome: Progressing  Intervention: Minimize Safety Risk  Recent " Flowsheet Documentation  Taken 5/3/2025 0745 by Yao Méndez, RN  Enhanced Safety Measures:   pain management   patient/family teach back on injury risk   review medications for side effects with activity  Goal: Improved Attention and Thought Clarity  Outcome: Progressing  Goal: Improved Sleep  Outcome: Progressing     Problem: Fluid Volume Excess  Goal: Fluid Balance  Outcome: Progressing   Goal Outcome Evaluation:      Plan of Care Reviewed With: patient    Overall Patient Progress: no changeOverall Patient Progress: no change    Outcome Evaluation: Diuresis

## 2025-05-03 NOTE — PROGRESS NOTES
Cross cover notified with question regarding warfarin order.  Warfarin was ordered on admission for A-fib, but patient switched to apixaban 2.5 mg twice daily last month.  -Discontinue warfarin  -Ordered apixaban 2.5 mg twice daily

## 2025-05-03 NOTE — PROGRESS NOTES
[facility]    Hospitalist Progress Note      Assessment & Plan   Eric Poole is a 84 year old man who was admitted on 5/2/2025. PMH significant for chronic systolic heart failure w/ LVEF 20%, mild AVR and pacemaker/ICD, pulmonary hypertension w/ severe LETICIA , moderate COPD, PAF on Eliquis, hx secondary erythrocytosis (s/p therapeutic phlebotomies), BPH, chronic hypotension, and overactive bladder.  Patient presented for evaluation due to shortness of breath and report of abnormal lab (BNP markedly elevated).  Patient was establishing with new provider in [place] and pending outpatient cardiology referral as he had previously followed in [place].  On presentation the emergency department, patient was noting increased shortness of breath, exertional dyspnea, and lower extremity edema.  Denied any chest pain.  In ED was noted to have BNP 27K and was given 60 IV lasix x1 with good response. Trop minimally elevated at 26, EKG with afib but rate controlled. Of note, patient was admitted in [place] in March 2025 for heart failure exacerbation.  He had medications adjusted at that time due to hypotension, DIEGO, CHF.     Acute on chronic systolic HF, decompensated  Type 2 MI with elevated, stable troponin  Cardiomyopathy w/ EF 15-20% s/p ICD  -presented to establish with local physician and had abnormal lab. Presented to ED today and found to have BNP >27k with LE edema, sob, exertional dyspnea.   -trop 26, but no CP. Trend trops and monitor on tele  -given 60 IV lasix x1 in ED and continue Lasix 40 mg IV twice daily. Will continue spironolactone 12.5 mg daily, as well.   - Appreciate cardiology consultation for ongoing recommendations.  Patient prefers to follow with [facility] instead of [facility] (upcoming new patient appointment) since he plans to seek hospital care in [place].   - Discontinuing digoxin per cardiology recommendations.  - Continue PTA aspirin, statin.  -strict I/O's,  daily weights. Placing West 5/3 to better assess diuresis.   - Updated echo noted.  - Fluid restriction.  - GDMT has been limited by hypotension and UTI. Patient to follow with [facility].     Atrial fibrillation  Chronic anticoagulation with Eliquis  -as above, recently had meds adjusted. Cont digoxin and coumadin     Pulmonary HTN  Severe LETICIA  Moderate COPD  History of chronic respiratory failure  Has required O2 in the past. Monitor for requirements here. On no maintenance medications.     Chronic hypotension  Continue PTA midodrine.    Suspected CKD  -Cr 1.18, noted to be 1.16 on recent admission in [place]  - Volume overloaded, however, and suspect Cr may increase at dry weight  - Creatinine 1.23 on 5/3 with diuresis.   Renally dose medications and avoid nephrotoxins. Follow BMP.     BPH  Overactive bladder  History of urinary retention  - Admission med rec deleted Cardura and Proscar from current medications.  West catheter being placed as above for strict ins and outs.  Monitor for retention and consider if any of these medications would be beneficial for patient moving forward.    GERD  Continue PTA PPI.    DVT Prophylaxis: DOAC  Code Status: No CPR- Do NOT Intubate  Medically Ready for Discharge: Anticipated in 2-4 Days  Expected discharge: Anticipate hospital stay at least 2-4 more days pending ongoing IV diuresis    Marilyn Rai MD FACP  Hospitalist Service  [facility]        Interval History   Patient seen resting in room with wife at bedside. Continuing IV diuresis and optimizing medication regimen.  Appreciate cardiology recommendations and patient plans to follow-up with [facility].  Patient has been straining to make urine and does seem to be having some degree of retention.  Tentatively planning West catheter for strict ins and outs at this time and will continue to monitor. Otherwise no new complaints.    -Data reviewed today: I reviewed all new  labs and imaging results over the last 24 hours.      Physical Exam   Temp: 97.4  F (36.3  C) Temp src: Oral BP: 103/68 Pulse: 61   Resp: 20 SpO2: 92 % O2 Device: None (Room air)    Vitals:    05/02/25 2231 05/03/25 0314   Weight: 71.9 kg (158 lb 8.2 oz) 72.6 kg (160 lb)     Vital Signs with Ranges  Temp:  [97.3  F (36.3  C)-97.6  F (36.4  C)] 97.4  F (36.3  C)  Pulse:  [54-76] 61  Resp:  [12-29] 20  BP: (100-123)/(60-83) 103/68  SpO2:  [92 %-100 %] 92 %  I/O last 3 completed shifts:  In: 480 [P.O.:480]  Out: 575 [Urine:575]    Constitutional: Pleasant older gentleman seen sitting up in bed. Wife at bedside. Patient is alert and oriented x3. No acute distress.   HEENT: NCAT. EOMI. Moist oral mucosa.  Respiratory: Clear to auscultation bilaterally. No crackles or wheezes.  Cardiovascular: Regular rate; paced rhythm. No murmur. Venous stasis changes to lower extremities with 2+ edema bilaterally.  GI: Soft, nontender, nondistended.   Musculoskeletal: No gross deformities.   Neurologic: Alert and oriented x3. No focal neurologic deficits. Did not assess gait.      Medications   Current Facility-Administered Medications   Medication Dose Route Frequency Provider Last Rate Last Admin    Continuing ACE inhibitor/ARB/ARNI from home medication list OR ACE inhibitor/ARB/ARNI order already placed during this visit   Does not apply DOES NOT GO TO Bentley Timmons DO        Continuing beta blocker from home medication list OR beta blocker order already placed during this visit   Does not apply DOES NOT GO TO MAR Bentley Henry DO         Current Facility-Administered Medications   Medication Dose Route Frequency Provider Last Rate Last Admin    apixaban ANTICOAGULANT (ELIQUIS) tablet 2.5 mg  2.5 mg Oral BID Wang Moeller MD   2.5 mg at 05/03/25 0801    aspirin EC tablet 81 mg  81 mg Oral QPM Marilyn Montez MD        atorvastatin (LIPITOR) tablet 20 mg  20 mg Oral Once Carl, Bentley A, DO        [START ON  5/4/2025] atorvastatin (LIPITOR) tablet 40 mg  40 mg Oral QPM Marilyn Montez MD        digoxin (LANOXIN) tablet 125 mcg  125 mcg Oral Daily Carl, Bentley A, DO   125 mcg at 05/03/25 1100    furosemide (LASIX) injection 40 mg  40 mg Intravenous bid 08 & 14 Carl, Bentley A, DO   40 mg at 05/03/25 0804    midodrine (PROAMATINE) tablet 10 mg  10 mg Oral TID w/meals Carl, Bentley A, DO   10 mg at 05/03/25 1204    pantoprazole (PROTONIX) EC tablet 40 mg  40 mg Oral Daily Carl, Bentley A, DO   40 mg at 05/03/25 0801    spironolactone (ALDACTONE) half-tab 12.5 mg  12.5 mg Oral Daily Calr, Bentley A, DO   12.5 mg at 05/03/25 1101       Data   Recent Labs   Lab 05/03/25  0516 05/02/25  2337 05/02/25  1847 05/02/25  1825   WBC 6.5  --   --  7.0   HGB 14.8  --   --  16.3   MCV 92  --   --  93     --   --  228   INR 1.67* 1.59* 1.48*  --      --   --  140   POTASSIUM 4.3  --   --  4.5   CHLORIDE 102  --   --  101   CO2 28  --   --  28   BUN 30.8*  --   --  35.2*   CR 1.23*  --   --  1.18*   ANIONGAP 10  --   --  11   ESTEFANÍA 8.7*  --   --  9.0   GLC 92  --   --  100*   ALBUMIN  --   --   --  3.8   PROTTOTAL  --   --   --  6.5   BILITOTAL  --   --   --  0.9   ALKPHOS  --   --   --  109   ALT  --   --   --  9   AST  --   --   --  24       Recent Results (from the past 24 hours)   XR Chest 2 Views    Narrative    EXAM: XR CHEST 2 VIEWS  LOCATION: [facility]  DATE: 5/2/2025    INDICATION: elev bnp  COMPARISON: Frontal and lateral views of the chest 5/1/2025      Impression    IMPRESSION:     Left subclavian approach pacer defibrillator has right atrial appendage and right ventricular leads. The cardiac silhouette is enlarged, however the lower heart borders are largely silhouetted. Ectatic thoracic aorta is unchanged with crescentic   calcification at the arch. Pulmonary arteries at the onofre are prominent but unchanged.    Small pleural effusions and atelectasis in the bases. There are no  findings to suggest interstitial or alveolar edema.    Diffuse, flowing mild to moderate thoracic spine degenerative osteophytes.   Echocardiogram Complete   Result Value    LVEF  15-20%    [facility]    [id number]  [id number]  [id number]  [id number]^[email or web address]^JENNIFER^ADRIENNE     [facility]  Echocardiography Laboratory  [address] [place] [address]     Name: IJEOMA SPENCER  MRN: [id number]  : 1940  Study Date: 2025 08:17 AM  Age: 84 yrs  Gender: Male  Patient Location: [facility]  Reason For Study: Heart Failure  Ordering Physician: JENNIFER ARREDONDO  Performed By: Christine Guy     BSA: 1.9 m2  Height: 71 in  Weight: 160 lb  HR: 61  BP: 109/68 mmHg  ______________________________________________________________________________  Procedure  Echocardiogram with two-dimensional, color and spectral Doppler. Optison (NDC  #[id number]) given intravenously.  ______________________________________________________________________________  Interpretation Summary     The visual ejection fraction is 15-20%.  There is severe global hypokinesia of the left ventricle.  The left ventricle is severely dilated.  There is a catheter/pacemaker lead seen in the right ventricle.  There is moderate (2+) mitral regurgitation.  There is mild (1+) tricuspid regurgitation.  There is mild (1+) aortic regurgitation.  There is no pericardial effusion.  Moderate left pleural effusion     Outside echo dated 25 with EF < 20%  ______________________________________________________________________________  Left Ventricle  The left ventricle is severely dilated. There is normal left ventricular wall  thickness. The visual ejection fraction is 15-20%. There is severe global  hypokinesia of the left ventricle.     Right Ventricle  The right ventricle is normal in structure, function and size. There is a  catheter/pacemaker lead seen in the right ventricle.     Atria  There is mod-severe biatrial enlargement.     Mitral Valve  There  is moderate (2+) mitral regurgitation.     Tricuspid Valve  There is mild (1+) tricuspid regurgitation.     Aortic Valve  There is mild (1+) aortic regurgitation.     Pulmonic Valve  The pulmonic valve is not well visualized.     Vessels  Normal size aorta. Mildly dilated proximal aorta. The inferior vena cava is  normal.     Pericardium  There is no pericardial effusion. Moderate left pleural effusion.     ______________________________________________________________________________  MMode/2D Measurements & Calculations  IVSd: 1.0 cm  LVIDd: 8.0 cm  LVIDs: 7.4 cm  LVPWd: 1.1 cm  IVC diam: 2.0 cm  FS: 7.1 %     LV mass(C)d: 418.9 grams  LV mass(C)dI: 218.4 grams/m2  Ao root diam: 4.4 cm  asc Aorta Diam: 4.1 cm  LVOT diam: 2.4 cm  LVOT area: 4.6 cm2  Ao root diam index Ht(cm/m): 2.4  Ao root diam index BSA (cm/m2): 2.3  Asc Ao diam index BSA (cm/m2): 2.1  Asc Ao diam index Ht(cm/m): 2.3  LA Volume (BP): 112.0 ml  LA Volume Index (BP): 58.3 ml/m2     RV Base: 4.5 cm  RWT: 0.26  TAPSE: 2.0 cm     Doppler Measurements & Calculations  MV E max vincenzo: 70.7 cm/sec  MV dec time: 0.14 sec  Ao V2 max: 182.4 cm/sec  Ao max P.0 mmHg  Ao V2 mean: 128.0 cm/sec  Ao mean P.3 mmHg  Ao V2 VTI: 30.5 cm  ALTA(I,D): 1.9 cm2  ALTA(V,D): 1.8 cm2  AI P1/2t: 754.7 msec  LV V1 max P.1 mmHg  LV V1 max: 72.7 cm/sec  LV V1 VTI: 12.4 cm  MR PISA: 1.4 cm2  MR ERO: 0.11 cm2  MR volume: 17.7 ml  SV(LVOT): 57.3 ml  SI(LVOT): 29.8 ml/m2  PA acc time: 0.09 sec  TR max vincenzo: 277.2 cm/sec  TR max P.8 mmHg  AV Vincenzo Ratio (DI): 0.40  ALTA Index (cm2/m2): 0.98     RV S Vincenzo: 11.4 cm/sec     ______________________________________________________________________________  Report approved by: SURAJ Cuevas on 2025 09:40 AM

## 2025-05-03 NOTE — H&P
[facility]    History and Physical - Hospitalist Service       Date of Admission:  5/2/2025    Assessment & Plan    Eric Poole is a 84 year old male w/PMH chronic systolic heart failure w/ LVEF 20%, mild AVR and pacemaker/ICD, pulmonary hypertension w/ severe LETICIA , moderate COPD, PAF on coumadin, hx secondary erythrocytosis (s/p therapeutic phlebotomies), BPH, chronic hypotension, overactive bladder who presents after having abnormal labs with sob and found to be in CHF exacerbation    Acute on chronic systolic HF  Detectable troponin  Cardiomyopathy w/EF 20% s/p ICD  Pulmonary HTN, severe LETICIA, moderate COPD  -presents to establish with local physician and had abnormal lab. Presented to ED today and found to have BNP >27k with LE edema, sob, exertional dyspnea. CXR full, EKG afib  -trop 26, but no CP. Trend trops and monitor on tele  -given 60 IV lasix x1 in ED and will monitor response  -for now place on 40 BID IV lasix and hold home lasix today  -strict I/O's, ECHO, fluid restriction and will have [facility] clinic evaluate him  -was admitted to hospital in [place] in march and aldactone and digoxin were added, metoprolol/lisinopril stopped and will continue that regimen    Afib on coumadin  -as above, recently had meds adjusted. Cont digoxin and coumadin    Suspected CKD  -Cr 1.18, noted to be 1.16 on recent [place] admission  -monitor    Hx BPH,  chronic hypotension, overactive bladder, ? Chronic respiratory failure  -resume home statin, proscar, omeprazole, midodrine (based on discharge summary from 3/10/25)  -consider resuming cardura if BP stable        Diet:  cardiac diet, fluid restriction  DVT Prophylaxis: Warfarin  West Catheter: Not present  Lines: None     Cardiac Monitoring: None  Code Status:  DNR/DNI, confirmed with patient and wife      Disposition Plan     Medically Ready for Discharge: Anticipated Tomorrow           Bentley Henry DO  Hospitalist Service  [facility]  [facility]  Securely message with Mimi (more info)  Text page via [facility] Paging/Directory     ______________________________________________________________________    Chief Complaint   sob    History of Present Illness   Eric Poole is a 84 year old male w/PMH chronic systolic heart failure w/ LVEF 20%, mild AVR and pacemaker/ICD, pulmonary hypertension w/ severe LETICIA , moderate COPD, PAF on coumadin, hx secondary erythrocytosis (s/p therapeutic phlebotomies), BPH, chronic hypotension, overactive bladder who presents after having abnormal labs with sob. He is seen with wife and I believe he established with a new local provider this week and had labs done showing markedly elevated BNP and was told to present to ED but fell asleep yesterday instead. Presented to ED today and notes increased sob, exertional dyspnea, LE edema but denies any orthopnea, cough, fever, CP or other complaints.    In ED was noted to have BNP 27K and was given 60 IV lasix x1 with good response. Trop minimally elevated at 26, EKG with afib but rate controlled. OF note was admitted down in [place] back in march for CHF and had some meds adjusted at that time due to hypotension, DIEGO, CHF.      Past Medical History    Past Medical History:   Diagnosis Date    BPH (benign prostatic hypertrophy) 5/14/2013    GERD (gastroesophageal reflux disease) 5/14/2013    Hypertension goal BP (blood pressure) < 140/90     Malignant neoplasm of prostate (H) 5/11/2011       Past Surgical History   Past Surgical History:   Procedure Laterality Date    CATARACT EXTRACTION Bilateral     ELBOW SURGERY  1995    left    ELBOW SURGERY Left     EYE SURGERY  2012    bilateral cataract removal    KNEE SURGERY  2001    arthroscopic meniscal repair on left     KNEE SURGERY  1960    right knee fracture and repair    KNEE SURGERY  2008    right knee TKA    TOTAL KNEE ARTHROPLASTY Right     TOTAL KNEE ARTHROPLASTY Left 07/17/2018    wisdom teeth removal  age 39        Prior to Admission Medications   Prior to Admission Medications   Prescriptions Last Dose Informant Patient Reported? Taking?   Fexofenadine HCl (ALLEGRA PO)   Yes No   Sig: Take 1 tablet by mouth daily   Multiple Vitamins-Minerals (ICAPS PLUS) TABS   Yes No   Sig: Take  by mouth.   atorvastatin (LIPITOR) 20 MG tablet   No No   Sig: Take 1 tablet (20 mg) by mouth daily   doxazosin (CARDURA) 1 MG tablet   No No   Sig: Take 1 tablet (1 mg) by mouth daily   finasteride (PROSCAR) 5 MG tablet   No No   Sig: Take 1 tablet (5 mg) by mouth daily   metoprolol (TOPROL-XL) 50 MG 24 hr tablet   No No   Sig: Take 1 tablet (50 mg) by mouth daily   omeprazole (PRILOSEC) 20 MG capsule   No No   Sig: Take 1 capsule (20 mg) by mouth daily   warfarin (COUMADIN) 5 MG tablet   No No   Sig: Start taking 1 1/2 tablets on Tuesday (10/20/2015) and Wednesday nights and then take 5 mg on  Thursday night and get INR on Friday.      Facility-Administered Medications: None        Review of Systems    The 10 point Review of Systems is negative other than noted in the HPI or here.     Social History   I have reviewed this patient's social history and updated it with pertinent information if needed.  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  Family History   Problem Relation Age of Onset    Hypertension Mother     Arthritis Mother         osteoarthritis    Cardiovascular Mother         MI at 93 years old    Hypertension Father     Cardiovascular Father         MI at 77 years old     Arthritis Paternal Uncle         osteoarthritis    Arthritis Paternal Uncle         osteoarthritis    Arthritis Paternal Uncle         osteoarthritis    Arthritis Paternal Aunt         osteoarthritis    Arthritis Paternal Aunt         osteoarthritis    Osteoarthritis Mother     Acute Myocardial Infarction Father          Allergies   Allergies   Allergen Reactions    Scopolamine Unknown        Physical Exam   Vital Signs: Temp: 97.5  F (36.4  C) Temp src: Temporal BP: 123/78 Pulse: 76   Resp: 16 SpO2: 93 %      Weight: 0 lbs 0 oz    Constitutional: awake, alert, and cooperative, chronically ill appearing  Respiratory: crackles at bases, no wheezing, not on oxygen  Cardiovascular: irregularly irregular rhythm, no murmur, LE edema  GI: normal bowel sounds, soft, and non-distended  Skin: scattered bruises of differing ages and sizes on extremeties  Neurologic: alert, interactive, generally weak appearing    Medical Decision Making       65 MINUTES SPENT BY ME on the date of service doing chart review, history, exam, documentation & further activities per the note.      Data   ------------------------- PAST 24 HR DATA REVIEWED -----------------------------------------------    I have personally reviewed the following data over the past 24 hrs:    7.0  \   16.3   / 228     140 101 35.2 (H) /  100 (H)   4.5 28 1.18 (H) \     ALT: 9 AST: 24 AP: 109 TBILI: 0.9   ALB: 3.8 TOT PROTEIN: 6.5 LIPASE: N/A     Trop: 26 (H) BNP: 27,253 (H)     INR:  1.48 (H) PTT:  N/A   D-dimer:  N/A Fibrinogen:  N/A       Imaging results reviewed over the past 24 hrs:   Recent Results (from the past 24 hours)   XR Chest 2 Views    Narrative    EXAM: XR CHEST 2 VIEWS  LOCATION: [facility]  DATE: 5/2/2025    INDICATION: elev bnp  COMPARISON: Frontal and lateral views of the chest 5/1/2025      Impression    IMPRESSION:     Left subclavian approach pacer defibrillator has right atrial appendage and right ventricular leads.  The cardiac silhouette is enlarged, however the lower heart borders are largely silhouetted. Ectatic thoracic aorta is unchanged with crescentic   calcification at the arch. Pulmonary arteries at the onofre are prominent but unchanged.    Small pleural effusions and atelectasis in the bases. There are no findings to suggest interstitial or alveolar edema.    Diffuse, flowing mild to moderate thoracic spine degenerative osteophytes.

## 2025-05-04 ENCOUNTER — APPOINTMENT (OUTPATIENT)
Dept: OCCUPATIONAL THERAPY | Facility: CLINIC | Age: 85
End: 2025-05-04
Payer: COMMERCIAL

## 2025-05-04 LAB
ANION GAP SERPL CALCULATED.3IONS-SCNC: 10 MMOL/L (ref 7–15)
ANION GAP SERPL CALCULATED.3IONS-SCNC: 12 MMOL/L (ref 7–15)
BASOPHILS # BLD AUTO: 0.1 10E3/UL (ref 0–0.2)
BASOPHILS NFR BLD AUTO: 1 %
BUN SERPL-MCNC: 27.3 MG/DL (ref 8–23)
BUN SERPL-MCNC: 28.2 MG/DL (ref 8–23)
CALCIUM SERPL-MCNC: 8.5 MG/DL (ref 8.8–10.4)
CALCIUM SERPL-MCNC: 8.7 MG/DL (ref 8.8–10.4)
CHLORIDE SERPL-SCNC: 101 MMOL/L (ref 98–107)
CHLORIDE SERPL-SCNC: 103 MMOL/L (ref 98–107)
CREAT SERPL-MCNC: 1.13 MG/DL (ref 0.67–1.17)
CREAT SERPL-MCNC: 1.13 MG/DL (ref 0.67–1.17)
EGFRCR SERPLBLD CKD-EPI 2021: 64 ML/MIN/1.73M2
EGFRCR SERPLBLD CKD-EPI 2021: 64 ML/MIN/1.73M2
EOSINOPHIL # BLD AUTO: 0.1 10E3/UL (ref 0–0.7)
EOSINOPHIL NFR BLD AUTO: 2 %
ERYTHROCYTE [DISTWIDTH] IN BLOOD BY AUTOMATED COUNT: 16.9 % (ref 10–15)
GLUCOSE SERPL-MCNC: 104 MG/DL (ref 70–99)
GLUCOSE SERPL-MCNC: 95 MG/DL (ref 70–99)
HCO3 SERPL-SCNC: 25 MMOL/L (ref 22–29)
HCO3 SERPL-SCNC: 26 MMOL/L (ref 22–29)
HCT VFR BLD AUTO: 45.7 % (ref 40–53)
HGB BLD-MCNC: 15 G/DL (ref 13.3–17.7)
IMM GRANULOCYTES # BLD: 0 10E3/UL
IMM GRANULOCYTES NFR BLD: 0 %
INR PPP: 1.65 (ref 0.85–1.15)
LYMPHOCYTES # BLD AUTO: 1 10E3/UL (ref 0.8–5.3)
LYMPHOCYTES NFR BLD AUTO: 17 %
MAGNESIUM SERPL-MCNC: 1.8 MG/DL (ref 1.7–2.3)
MAGNESIUM SERPL-MCNC: 1.8 MG/DL (ref 1.7–2.3)
MCH RBC QN AUTO: 30.1 PG (ref 26.5–33)
MCHC RBC AUTO-ENTMCNC: 32.8 G/DL (ref 31.5–36.5)
MCV RBC AUTO: 92 FL (ref 78–100)
MONOCYTES # BLD AUTO: 0.5 10E3/UL (ref 0–1.3)
MONOCYTES NFR BLD AUTO: 8 %
NEUTROPHILS # BLD AUTO: 4.1 10E3/UL (ref 1.6–8.3)
NEUTROPHILS NFR BLD AUTO: 72 %
NRBC # BLD AUTO: 0 10E3/UL
NRBC BLD AUTO-RTO: 0 /100
PLATELET # BLD AUTO: 198 10E3/UL (ref 150–450)
POTASSIUM SERPL-SCNC: 4.1 MMOL/L (ref 3.4–5.3)
POTASSIUM SERPL-SCNC: 4.1 MMOL/L (ref 3.4–5.3)
PROTHROMBIN TIME: 19.5 SECONDS (ref 11.8–14.8)
RBC # BLD AUTO: 4.99 10E6/UL (ref 4.4–5.9)
SODIUM SERPL-SCNC: 138 MMOL/L (ref 135–145)
SODIUM SERPL-SCNC: 139 MMOL/L (ref 135–145)
WBC # BLD AUTO: 5.7 10E3/UL (ref 4–11)

## 2025-05-04 PROCEDURE — 83735 ASSAY OF MAGNESIUM: CPT | Performed by: NURSE PRACTITIONER

## 2025-05-04 PROCEDURE — 97535 SELF CARE MNGMENT TRAINING: CPT | Mod: GO | Performed by: OCCUPATIONAL THERAPIST

## 2025-05-04 PROCEDURE — 99232 SBSQ HOSP IP/OBS MODERATE 35: CPT | Performed by: INTERNAL MEDICINE

## 2025-05-04 PROCEDURE — 85025 COMPLETE CBC W/AUTO DIFF WBC: CPT | Performed by: INTERNAL MEDICINE

## 2025-05-04 PROCEDURE — 82565 ASSAY OF CREATININE: CPT | Performed by: INTERNAL MEDICINE

## 2025-05-04 PROCEDURE — 36415 COLL VENOUS BLD VENIPUNCTURE: CPT | Performed by: NURSE PRACTITIONER

## 2025-05-04 PROCEDURE — 250N000013 HC RX MED GY IP 250 OP 250 PS 637: Performed by: INTERNAL MEDICINE

## 2025-05-04 PROCEDURE — 120N000001 HC R&B MED SURG/OB

## 2025-05-04 PROCEDURE — 85610 PROTHROMBIN TIME: CPT | Performed by: HOSPITALIST

## 2025-05-04 PROCEDURE — 99233 SBSQ HOSP IP/OBS HIGH 50: CPT | Performed by: NURSE PRACTITIONER

## 2025-05-04 PROCEDURE — 250N000013 HC RX MED GY IP 250 OP 250 PS 637: Performed by: HOSPITALIST

## 2025-05-04 PROCEDURE — 36415 COLL VENOUS BLD VENIPUNCTURE: CPT | Performed by: HOSPITALIST

## 2025-05-04 PROCEDURE — 80048 BASIC METABOLIC PNL TOTAL CA: CPT | Performed by: NURSE PRACTITIONER

## 2025-05-04 PROCEDURE — 250N000011 HC RX IP 250 OP 636: Performed by: HOSPITALIST

## 2025-05-04 RX ADMIN — FUROSEMIDE 40 MG: 10 INJECTION, SOLUTION INTRAVENOUS at 09:30

## 2025-05-04 RX ADMIN — ASPIRIN 81 MG: 81 TABLET, COATED ORAL at 20:00

## 2025-05-04 RX ADMIN — ATORVASTATIN CALCIUM 40 MG: 40 TABLET, FILM COATED ORAL at 20:00

## 2025-05-04 RX ADMIN — MIDODRINE HYDROCHLORIDE 10 MG: 10 TABLET ORAL at 13:10

## 2025-05-04 RX ADMIN — APIXABAN 2.5 MG: 2.5 TABLET, FILM COATED ORAL at 20:00

## 2025-05-04 RX ADMIN — MIDODRINE HYDROCHLORIDE 10 MG: 10 TABLET ORAL at 09:29

## 2025-05-04 RX ADMIN — MICONAZOLE NITRATE: 20 POWDER TOPICAL at 09:47

## 2025-05-04 RX ADMIN — MICONAZOLE NITRATE: 20 POWDER TOPICAL at 19:58

## 2025-05-04 RX ADMIN — APIXABAN 2.5 MG: 2.5 TABLET, FILM COATED ORAL at 09:27

## 2025-05-04 RX ADMIN — PANTOPRAZOLE SODIUM 40 MG: 40 TABLET, DELAYED RELEASE ORAL at 09:29

## 2025-05-04 RX ADMIN — Medication 12.5 MG: at 09:27

## 2025-05-04 RX ADMIN — MIDODRINE HYDROCHLORIDE 10 MG: 10 TABLET ORAL at 17:57

## 2025-05-04 RX ADMIN — FUROSEMIDE 40 MG: 10 INJECTION, SOLUTION INTRAVENOUS at 13:12

## 2025-05-04 ASSESSMENT — ACTIVITIES OF DAILY LIVING (ADL)
ADLS_ACUITY_SCORE: 43
DEPENDENT_IADLS:: INDEPENDENT
ADLS_ACUITY_SCORE: 43

## 2025-05-04 NOTE — DISCHARGE INSTRUCTIONS
Please call [facility] ([facility] Clinic) with any concerns:  Monday-Friday 8:00 AM to 4:30 PM   General Line: [phone] or Dr. Armenta Nurse Line: [phone]  After hours:  [phone]    -- Please start weighing self daily and write in weight log chart to bring into your cardiology/CORE clinic follow up appts.   -- Please bring in current medication bottles to cardiology/CORE appts for review.   -- Call nurse with weight gain greater than 2 pounds overnight, and/or 5 pounds in a week, and/or worsening shortness of breath/edema(swelling)/difficulty breathing while laying flat.       Your labs are NOT FASTING on 5/13/25 @ 11:45 AM.  OK to eat.    -----------------------------------------------------------------------------------------------------------------------    HOMECARE NOTE:   Your doctor has ordered home care to help you after your hospital stay.  The staff will contact you to schedule your first visit.  This service will be provided by [employer].  If you have any question, or have not received a call within 48 hours of discharge, please call them at [phone]. *please see homecare quality ratings for all homecares in your area at www.medicare.gov/care-compare

## 2025-05-04 NOTE — PROGRESS NOTES
[facility]    Hospitalist Progress Note  Date of admit: 5/2/2025  HD#2; DOS: 5/4/2025     Assessment & Plan   Eric Poole is a 84 year old man who was admitted on 5/2/2025. PMH significant for chronic systolic heart failure w/ LVEF 20%, mild AVR and pacemaker/ICD, pulmonary hypertension w/ severe LETICIA , moderate COPD, PAF on Eliquis, hx secondary erythrocytosis (s/p therapeutic phlebotomies), BPH, chronic hypotension, and overactive bladder.  Patient presented for evaluation due to shortness of breath and report of abnormal lab (BNP markedly elevated).  Patient was establishing with new provider in [place] and pending outpatient cardiology referral as he had previously followed in [place].  On presentation the emergency department, patient was noting increased shortness of breath, exertional dyspnea, and lower extremity edema.  Denied any chest pain.  In ED was noted to have BNP 27K and was given 60 IV lasix x1 with good response. Trop minimally elevated at 26, EKG with afib but rate controlled. Of note, patient was admitted in [place] in March 2025 for heart failure exacerbation.  He had medications adjusted at that time due to hypotension, DIEGO, CHF.     Interval events:  Assumed care.  VSS. West placed due to BPH/retention.      Acute on chronic systolic HF, decompensated  Type 2 MI with elevated, stable troponin  Cardiomyopathy w/ EF 15-20% s/p ICD  -presented to establish with local physician and had abnormal lab. Presented to ED today and found to have BNP >27k with LE edema, sob, exertional dyspnea. Trop 26, but no CP. Trend trops and monitor on tele  -given 60 IV lasix x1 in ED and continue Lasix 40 mg IV twice daily. Spironolactone 12.5 mg daily, as well.   - Appreciate cardiology consultation for ongoing recommendations.  Patient prefers to follow with /[facility] here.  To be seen for CORE clinic evaluation in the AM 5/5. Hope to transition to PO diuretics on 5/5/25.  -  GDMT rather limited due to chronic hypotension (on midodrine), intolerance of ACEi/ARB/BB and recurrent UTI on SGLT-2.  On + spironolactone, furosemide, ASA and atorvastatin.   - cardiac diet with 2L fluid restriction.      Atrial fibrillation  Chronic anticoagulation with Eliquis  Type II NSTEMI   -Digoxin discontinued  (CKD likely cardiorenal).  Per discussion with [place] -- if AF with AVR recurs, consider AVNA.  No longer on warfarin and is on apixaban.   -FEN: replete elytes PRN.  K goal >/= 4 and Mg goal >/= 2. Repeat BMP and Mg in the AM.     Pulmonary HTN  Severe LETICIA  Moderate COPD  History of chronic respiratory failure  - Has required O2 in the past. Monitor for requirements here. On no maintenance medications.   - Offer CPAP here.  Likely needs reevaluation as OP -- notes he has not used his equipment in ~2 years and that it was breaking at that time (8-10 year old equipment).   Pulmonary toilet.     Chronic hypotension  - Continue PTA midodrine.    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  -Continue diuresing as above.  Hope to transition to PO diuretics 5/5/25   -Renally dose medications and avoid nephrotoxins. Follow BMP.-  -Continue strict I and O and daily weights.     BPH  Overactive bladder  History of urinary retention  - Admission med rec deleted Cardura and Proscar from current medications.    - Hold resuming above for now.      GERD  -Continue PTA PPI.    Protein Calorie Malnutrition:  BMI: 22  - Appreciate dietician assistance.  Encourage PO intake.  Supplements.      DVT Prophylaxis: DOAC  Code Status: No CPR- Do NOT Intubate  Medically Ready for Discharge: Anticipated in 2-4 Days  Expected discharge: Anticipate hospital stay at least 2 given tenuous fluid balance/diuresis.    GILMER Abraham CNP   Hospitalist Service  [facility]  Hospital    ------------------------------------------------------------------------------------------------    Interval History   Assumed care of patient.  VSS.   No CP or SOB.  Not hypoxic.    Made fleeting remark to RN staff about wanting to have a knife to cut the pulse ox cord.  Made in jest.  No clear delirium or safety concerns at present.        Physical Exam   Temp: 97.9  F (36.6  C) Temp src: Oral BP: 94/56 Pulse: 61   Resp: 20 SpO2: 94 % O2 Device: None (Room air)    Vitals:    05/02/25 2231 05/03/25 0314   Weight: 71.9 kg (158 lb 8.2 oz) 72.6 kg (160 lb)     Vital Signs with Ranges  Temp:  [96  F (35.6  C)-97.9  F (36.6  C)] 97.9  F (36.6  C)  Pulse:  [56-62] 61  Resp:  [18-20] 20  BP: ()/(56-69) 94/56  SpO2:  [93 %-97 %] 94 %  I/O last 3 completed shifts:  In: 1180 [P.O.:1180]  Out: 1875 [Urine:1875]    Constitutional: Pleasant older gentleman seen sitting up in bed. Wife at bedside. Patient is alert and oriented x3. No acute distress.   HEENT: NCAT. EOMI. Moist oral mucosa.  Respiratory: Clear to auscultation bilaterally. No crackles or wheezes.  Cardiovascular: Regular rate; paced rhythm. No murmur. Venous stasis changes to lower extremities with 1+ edema bilaterally.  GI: Soft, nontender, nondistended.   Musculoskeletal: No gross deformities.   Neurologic: Alert and oriented x3. No focal neurologic deficits. Did not assess gait.      Medications   Current Facility-Administered Medications   Medication Dose Route Frequency Provider Last Rate Last Admin    Continuing ACE inhibitor/ARB/ARNI from home medication list OR ACE inhibitor/ARB/ARNI order already placed during this visit   Does not apply DOES NOT GO TO Bentley Timmons, DO        Continuing beta blocker from home medication list OR beta blocker order already placed during this visit   Does not apply DOES NOT GO TO Bentley Timmons, DO         Current Facility-Administered Medications   Medication Dose Route Frequency Provider Last  Rate Last Admin    apixaban ANTICOAGULANT (ELIQUIS) tablet 2.5 mg  2.5 mg Oral BID Wang Moeller MD   2.5 mg at 05/04/25 0927    aspirin EC tablet 81 mg  81 mg Oral QPM Marilyn Montez MD   81 mg at 05/03/25 1956    atorvastatin (LIPITOR) tablet 40 mg  40 mg Oral QPM Marilyn Montez MD        furosemide (LASIX) injection 40 mg  40 mg Intravenous bid 08 & 14 Carl, Bentley A, DO   40 mg at 05/04/25 0930    miconazole (MICATIN) 2 % powder   Topical BID Marilyn Montez MD   Given at 05/04/25 0947    midodrine (PROAMATINE) tablet 10 mg  10 mg Oral TID w/meals Carl, Bentley A, DO   10 mg at 05/04/25 0929    pantoprazole (PROTONIX) EC tablet 40 mg  40 mg Oral Daily Carl, Bentley A, DO   40 mg at 05/04/25 0929    spironolactone (ALDACTONE) half-tab 12.5 mg  12.5 mg Oral Daily Carl, Bentley A, DO   12.5 mg at 05/04/25 0927       Data   Recent Labs   Lab 05/04/25  0536 05/03/25  0516 05/02/25  2337 05/02/25  1847 05/02/25  1825   WBC 5.7 6.5  --   --  7.0   HGB 15.0 14.8  --   --  16.3   MCV 92 92  --   --  93    206  --   --  228   INR 1.65* 1.67* 1.59*   < >  --     140  --   --  140   POTASSIUM 4.1 4.3  --   --  4.5   CHLORIDE 103 102  --   --  101   CO2 26 28  --   --  28   BUN 28.2* 30.8*  --   --  35.2*   CR 1.13 1.23*  --   --  1.18*   ANIONGAP 10 10  --   --  11   ESTEFANÍA 8.7* 8.7*  --   --  9.0   GLC 95 92  --   --  100*   ALBUMIN  --   --   --   --  3.8   PROTTOTAL  --   --   --   --  6.5   BILITOTAL  --   --   --   --  0.9   ALKPHOS  --   --   --   --  109   ALT  --   --   --   --  9   AST  --   --   --   --  24    < > = values in this interval not displayed.       No results found for this or any previous visit (from the past 24 hours).       day(s)

## 2025-05-04 NOTE — PLAN OF CARE
Goal Outcome Evaluation:      Plan of Care Reviewed With: patient    Overall Patient Progress: improvingOverall Patient Progress: improving    Outcome Evaluation: Home with homecare

## 2025-05-04 NOTE — CONSULTS
Care Management Initial Consult    General Information  Assessment completed with: Patient, Spouse or significant other,    Type of CM/SW Visit: Initial Assessment    Primary Care Provider verified and updated as needed: Yes   Readmission within the last 30 days: no previous admission in last 30 days      Reason for Consult: discharge planning  Advance Care Planning:            Communication Assessment  Patient's communication style: spoken language (English or Bilingual)    Hearing Difficulty or Deaf: no   Wear Glasses or Blind: yes    Cognitive  Cognitive/Neuro/Behavioral: .WDL except, orientation  Level of Consciousness: alert  Arousal Level: opens eyes spontaneously  Orientation: disoriented to, situation  Mood/Behavior: calm, cooperative  Best Language: 0 - No aphasia  Speech: clear, spontaneous    Living Environment:   People in home: spouse     Current living Arrangements: independent living facility      Able to return to prior arrangements: yes       Family/Social Support:  Care provided by: self  Provides care for: no one  Marital Status:   Support system: Wife          Description of Support System: Supportive, Involved    Support Assessment: Adequate family and caregiver support    Current Resources:   Patient receiving home care services: No        Community Resources: None  Equipment currently used at home: walker, rolling (4WW with seat)  Supplies currently used at home:      Employment/Financial:  Employment Status:          Financial Concerns:             Does the patient's insurance plan have a 3 day qualifying hospital stay waiver?  Yes     Which insurance plan 3 day waiver is available? Alternative insurance waiver    Will the waiver be used for post-acute placement? No    Lifestyle & Psychosocial Needs:  Social Drivers of Health     Food Insecurity: Low Risk  (5/2/2025)    Food Insecurity     Within the past 12 months, did you worry that your food would run out before you got money to buy  more?: No     Within the past 12 months, did the food you bought just not last and you didn t have money to get more?: No   Depression: Not at risk (3/8/2025)    Received from [facility]    PHQ-2     Patient Health Questionnaire-2 Score: 0   Housing Stability: Low Risk  (5/2/2025)    Housing Stability     Do you have housing? : Yes     Are you worried about losing your housing?: No   Tobacco Use: Medium Risk (5/1/2025)    Received from [email or web address]    Patient History     Smoking Tobacco Use: Former     Smokeless Tobacco Use: Never     Passive Exposure: Not on file   Financial Resource Strain: Low Risk  (5/2/2025)    Financial Resource Strain     Within the past 12 months, have you or your family members you live with been unable to get utilities (heat, electricity) when it was really needed?: No   Alcohol Use: Not on file   Transportation Needs: Low Risk  (5/2/2025)    Transportation Needs     Within the past 12 months, has lack of transportation kept you from medical appointments, getting your medicines, non-medical meetings or appointments, work, or from getting things that you need?: No   Physical Activity: Not on file   Interpersonal Safety: Low Risk  (5/3/2025)    Interpersonal Safety     Do you feel physically and emotionally safe where you currently live?: Yes     Within the past 12 months, have you been hit, slapped, kicked or otherwise physically hurt by someone?: No     Within the past 12 months, have you been humiliated or emotionally abused in other ways by your partner or ex-partner?: No   Stress: Not on file   Social Connections: Socially Integrated (5/1/2025)    Received from [email or web address]    Social Connections     Do you often feel lonely or isolated from those around you?: 0   Health Literacy: Not on file       Functional Status:  Prior to admission patient needed assistance:   Dependent ADLs:: Ambulation-walker  Dependent  IADLs:: Independent  Assesssment of Functional Status: Not at baseline with ADL Functioning    Mental Health Status:          Chemical Dependency Status:                Values/Beliefs:  Spiritual, Cultural Beliefs, Denominational Practices, Values that affect care:                 Discussed  Partnership in Safe Discharge Planning  document with patient/family: No    Additional Information:    SW met with pt/spouse at the bedside to discuss homecare recommendation. Pt shares that they live at [facility] and do not receive any services. Pt is agreeable to homecare PT/OT recommendation and confirms that he will be homebound. Pt currently has an [facility] PCP, he plans to eventually change to [place]. Pt spouse will transport home. They deny further needs.     Referral for home PT/OT sent in hub.     Next Steps: obtain homecare.     Addendum    [facility] accepts for home RN/PT/OT. They will have an RN for the initial assessment. Information added to pts AVS.     Maya Chow/DAVID Elaine, KYLE  Inpatient Care Coordination  Emergency Room /Float  [phone]    Maya Chow, MARGARITO

## 2025-05-04 NOTE — PROGRESS NOTES
Notified provider about indwelling blake catheter discussed removal or continued need.    Did provider choose to remove indwelling blake catheter? NO    Provider's blake indication for keeping indwelling blake catheter: Indication for continued use: Retention    Is there an order for indwelling blake catheter? YES    *If there is a plan to keep blake catheter in place at discharge daily notification with provider is not necessary, but please add a notation in the treatment team sticky note that the patient will be discharging with the catheter.

## 2025-05-04 NOTE — PLAN OF CARE
"Pt A&Ox4 however pt did seem somewhat confused later on in night, requesting a knife to remove his pulse oximetry and making some other odd statements. Despite this, pt still oriented. Pt slept very little overnight and was awake whenever RN entered room. Denies pain. Endorses dyspnea upon exertion. West in place with good output. Miconazole powder applied to R groin rash. VSS with soft blood pressure. On telemetry v-paced afib CVR. Pt did not require oxygen overnight.     /65 (BP Location: Left arm)   Pulse 60   Temp 97.3  F (36.3  C) (Oral)   Resp 18   Ht 1.803 m (5' 11\")   Wt 72.6 kg (160 lb)   SpO2 94%   BMI 22.32 kg/m        Problem: Delirium  Goal: Improved Attention and Thought Clarity  Outcome: Not Progressing  Goal: Improved Sleep  Outcome: Not Progressing     Problem: Fluid Volume Excess  Goal: Fluid Balance  Outcome: Progressing     Problem: Adult Inpatient Plan of Care  Goal: Absence of Hospital-Acquired Illness or Injury  Intervention: Identify and Manage Fall Risk  Recent Flowsheet Documentation  Taken 5/3/2025 2000 by Omar Munoz RN  Safety Promotion/Fall Prevention:   assistive device/personal items within reach   lighting adjusted   patient and family education   safety round/check completed  Intervention: Prevent Skin Injury  Recent Flowsheet Documentation  Taken 5/3/2025 2000 by Omar Munzo RN  Body Position: position changed independently     "

## 2025-05-04 NOTE — PROGRESS NOTES
Cardiology Progress Note          Assessment and Plan:     84 year male with end stage HFrEF, EF 15-20%, ICD,  AFib on coumadin, CAD, chronic hypotension on midodrine, COPD  On lasix 40mg iV BID without much output response (blake place due to concerns of inaccurate I/Os) Symptomatic improvement since admission. CORE clinic eval in am due to frequent recurrent admission.  Limited GDMT due to chronic hypotension (on midodrine) does not tolerate ACEI/ARB or BB, recurrent UTI on SGLT2, on low dose spironolactone  AFIb- rate controlled, on apixaban         Interval History:     no new complaints                Medications:   I have reviewed this patient's current medications         Physical Exam:         Vital Sign Ranges  Temperature Temp  Av  F (36.1  C)  Min: 96  F (35.6  C)  Max: 97.9  F (36.6  C)   Blood pressure Systolic (24hrs), Av , Min:93 , Max:115        Diastolic (24hrs), Av, Min:59, Max:69      Pulse Pulse  Av.1  Min: 56  Max: 62   Respirations Resp  Av.7  Min: 18  Max: 20   Pulse oximetry SpO2  Av.6 %  Min: 93 %  Max: 97 %         Intake/Output Summary (Last 24 hours) at 2025 1257  Last data filed at 2025 0923  Gross per 24 hour   Intake 930 ml   Output 1575 ml   Net -645 ml       Constitutional:   in no apparent distress     Skin:   normal     Neck:   supple, symmetrical, trachea midline     Chest:   Normal Symmetry and no tenderness     Lungs:   Clear anteriorly     Cardiovascular:   Irregular distant     Extremities and Back:   Mild peripheral edema     Neurological:   No gross or focal neurologic abnormalities              Data:     Results for orders placed or performed during the hospital encounter of 25 (from the past 24 hours)   CBC with Platelets & Differential    Narrative    The following orders were created for panel order CBC with Platelets & Differential.  Procedure                               Abnormality         Status                      ---------                               -----------         ------                     CBC with platelets and ...[[id number]]  Abnormal            Final result                 Please view results for these tests on the individual orders.   INR   Result Value Ref Range    INR 1.65 (H) 0.85 - 1.15    PT 19.5 (H) 11.8 - 14.8 Seconds   Basic metabolic panel   Result Value Ref Range    Sodium 139 135 - 145 mmol/L    Potassium 4.1 3.4 - 5.3 mmol/L    Chloride 103 98 - 107 mmol/L    Carbon Dioxide (CO2) 26 22 - 29 mmol/L    Anion Gap 10 7 - 15 mmol/L    Urea Nitrogen 28.2 (H) 8.0 - 23.0 mg/dL    Creatinine 1.13 0.67 - 1.17 mg/dL    GFR Estimate 64 >60 mL/min/1.73m2    Calcium 8.7 (L) 8.8 - 10.4 mg/dL    Glucose 95 70 - 99 mg/dL   CBC with platelets and differential   Result Value Ref Range    WBC Count 5.7 4.0 - 11.0 10e3/uL    RBC Count 4.99 4.40 - 5.90 10e6/uL    Hemoglobin 15.0 13.3 - 17.7 g/dL    Hematocrit 45.7 40.0 - 53.0 %    MCV 92 78 - 100 fL    MCH 30.1 26.5 - 33.0 pg    MCHC 32.8 31.5 - 36.5 g/dL    RDW 16.9 (H) 10.0 - 15.0 %    Platelet Count 198 150 - 450 10e3/uL    % Neutrophils 72 %    % Lymphocytes 17 %    % Monocytes 8 %    % Eosinophils 2 %    % Basophils 1 %    % Immature Granulocytes 0 %    NRBCs per 100 WBC 0 <1 /100    Absolute Neutrophils 4.1 1.6 - 8.3 10e3/uL    Absolute Lymphocytes 1.0 0.8 - 5.3 10e3/uL    Absolute Monocytes 0.5 0.0 - 1.3 10e3/uL    Absolute Eosinophils 0.1 0.0 - 0.7 10e3/uL    Absolute Basophils 0.1 0.0 - 0.2 10e3/uL    Absolute Immature Granulocytes 0.0 <=0.4 10e3/uL    Absolute NRBCs 0.0 10e3/uL

## 2025-05-04 NOTE — PLAN OF CARE
Inpatient: 0700 - 1900    Dx: CHF Exacerbation       Orientation: Disoriented to situation - Did not provide clear explanation on why he is here.   Pain: Denies  Activity: Ax1 with Walker and Gait Belt   LDA: Right PIV SL - West in Place   Diet: Cardiac diet with 2000 mL Fluid Restriction   Neuro: Does display intermittent confusion (see note below) - Disoriented to situation as above - Communicates appropriately but can easily ramble off topic  Cardio: Heart sounds distant - has pacemaker/ICD. Weak dorsalis pedis pulses - capillary refill better compared to yesterday. Still has edema present in BLE.   Resp: Loose productive cough present - still having dyspnea upon exertion. Lungs sound clear   MS: Displays generalized weakness   GI: WDL  : Indwelling catheter placed 5/3 for difficulties urinating.   Derm: Scattered scabs, bruising, and abrasions more predominately on his upper extremities. Overall appears pale (conjunctiva pale) - Mucous membranes dry. Yassine appearing in extremities. Rash to groin folds. Notable to old wounds to left forearm and right shoulder.   Plan: Cardiology following - Continue IV diuresis            1545 had taken his pulse oxymetry and telemetry leads off and was found naked with gown off attempting to pull West out. He was disoriented to situation otherwise answered other questions appropriately. Easily redirected back to bed and monitoring equipment placed back on patient. Bed alarm on.         Previous wound noted to right posterior shoulder - Cleansed with antiseptic and foam dressing placed.           Continuing to diurese. CORE clinic to see patient at bedside Monday 5/5.           Shift I&O's:    I - 930  O - 975        Catheter care provided during shift per protocol and PRN - Miconazole powder applied to rash in groin - worse on right side.           BP 93/61 (BP Location: Left arm, Patient Position: Semi-Regalado's, Cuff Size: Adult Regular)   Pulse 60   Temp 97.9  F (36.6  " C) (Oral)   Resp 18   Ht 1.803 m (5' 11\")   Wt 72.6 kg (160 lb)   SpO2 94%   BMI 22.32 kg/m           Problem: Adult Inpatient Plan of Care  Goal: Plan of Care Review  Description: The Plan of Care Review/Shift note should be completed every shift.  The Outcome Evaluation is a brief statement about your assessment that the patient is improving, declining, or no change.  This information will be displayed automatically on your shiftnote.  Outcome: Progressing  Flowsheets (Taken 5/4/2025 0959)  Outcome Evaluation: Diuresing  Plan of Care Reviewed With: patient  Overall Patient Progress: no change  Goal: Patient-Specific Goal (Individualized)  Description: You can add care plan individualizations to a care plan. Examples of Individualization might be:  \"Parent requests to be called daily at 9am for status\", \"I have a hard time hearing out of my right ear\", or \"Do not touch me to wake me up as it startlesme\".  Outcome: Progressing  Goal: Absence of Hospital-Acquired Illness or Injury  Outcome: Progressing  Intervention: Identify and Manage Fall Risk  Recent Flowsheet Documentation  Taken 5/4/2025 0917 by Yao Méndez, RN  Safety Promotion/Fall Prevention:   activity supervised   assistive device/personal items within reach   clutter free environment maintained   lighting adjusted   mobility aid in reach   nonskid shoes/slippers when out of bed   patient and family education   room organization consistent   safety round/check completed   supervised activity  Intervention: Prevent Skin Injury  Recent Flowsheet Documentation  Taken 5/4/2025 0917 by Yao Méndez, RN  Body Position:   position changed independently   legs elevated  Goal: Optimal Comfort and Wellbeing  Outcome: Progressing  Intervention: Monitor Pain and Promote Comfort  Recent Flowsheet Documentation  Taken 5/4/2025 0919 by Yao Méndez, RN  Pain Management Interventions: declines  Goal: Readiness for Transition of Care  Outcome: Progressing   "   Problem: Delirium  Goal: Optimal Coping  Outcome: Progressing  Goal: Improved Behavioral Control  Outcome: Progressing  Intervention: Minimize Safety Risk  Recent Flowsheet Documentation  Taken 5/4/2025 0917 by Yao Méndez RN  Enhanced Safety Measures:   pain management   patient/family teach back on injury risk   review medications for side effects with activity  Goal: Improved Attention and Thought Clarity  Outcome: Progressing  Goal: Improved Sleep  Outcome: Progressing     Problem: Fluid Volume Excess  Goal: Fluid Balance  Outcome: Progressing   Goal Outcome Evaluation:      Plan of Care Reviewed With: patient    Overall Patient Progress: no changeOverall Patient Progress: no change    Outcome Evaluation: [place]

## 2025-05-04 NOTE — PROGRESS NOTES
Called by telemetry that all leads were off of patient - called by support staff to come to room. Patient found with gown off and had taken all leads and pulse oxymetry off. At time of entering room - he was trying to remove his West and attempting to get dressed to leave.       Easily re-oriented and redirected. Only disoriented to situation.       Placed back in bed with monitoring equipment back on.

## 2025-05-04 NOTE — PROVIDER NOTIFICATION
Notified by telemetry that patient's complex on rhythm were all tall (P, QRS, and T). Upon checking patient, lead placement was appropriate and denies any cardiac symptoms - He just states he is drowsy. Pulse oxymetry stable upon entering room.         X-cover notified - Labs ordered and awaiting draw.

## 2025-05-05 ENCOUNTER — APPOINTMENT (OUTPATIENT)
Dept: OCCUPATIONAL THERAPY | Facility: CLINIC | Age: 85
DRG: 280 | End: 2025-05-05
Payer: COMMERCIAL

## 2025-05-05 LAB
ANION GAP SERPL CALCULATED.3IONS-SCNC: 9 MMOL/L (ref 7–15)
ATRIAL RATE - MUSE: NORMAL BPM
BUN SERPL-MCNC: 29.1 MG/DL (ref 8–23)
CALCIUM SERPL-MCNC: 8.4 MG/DL (ref 8.8–10.4)
CHLORIDE SERPL-SCNC: 101 MMOL/L (ref 98–107)
CREAT SERPL-MCNC: 1.11 MG/DL (ref 0.67–1.17)
DIASTOLIC BLOOD PRESSURE - MUSE: NORMAL MMHG
EGFRCR SERPLBLD CKD-EPI 2021: 65 ML/MIN/1.73M2
ERYTHROCYTE [DISTWIDTH] IN BLOOD BY AUTOMATED COUNT: 16.8 % (ref 10–15)
GLUCOSE SERPL-MCNC: 91 MG/DL (ref 70–99)
HCO3 SERPL-SCNC: 29 MMOL/L (ref 22–29)
HCT VFR BLD AUTO: 47 % (ref 40–53)
HGB BLD-MCNC: 15.4 G/DL (ref 13.3–17.7)
INTERPRETATION ECG - MUSE: NORMAL
MAGNESIUM SERPL-MCNC: 1.8 MG/DL (ref 1.7–2.3)
MCH RBC QN AUTO: 30 PG (ref 26.5–33)
MCHC RBC AUTO-ENTMCNC: 32.8 G/DL (ref 31.5–36.5)
MCV RBC AUTO: 92 FL (ref 78–100)
P AXIS - MUSE: NORMAL DEGREES
PLATELET # BLD AUTO: 194 10E3/UL (ref 150–450)
POTASSIUM SERPL-SCNC: 4.1 MMOL/L (ref 3.4–5.3)
PR INTERVAL - MUSE: NORMAL MS
QRS DURATION - MUSE: 134 MS
QT - MUSE: 414 MS
QTC - MUSE: 430 MS
R AXIS - MUSE: -45 DEGREES
RBC # BLD AUTO: 5.13 10E6/UL (ref 4.4–5.9)
SODIUM SERPL-SCNC: 139 MMOL/L (ref 135–145)
SYSTOLIC BLOOD PRESSURE - MUSE: NORMAL MMHG
T AXIS - MUSE: 110 DEGREES
VENTRICULAR RATE- MUSE: 65 BPM
WBC # BLD AUTO: 5.6 10E3/UL (ref 4–11)

## 2025-05-05 PROCEDURE — 120N000001 HC R&B MED SURG/OB

## 2025-05-05 PROCEDURE — 250N000013 HC RX MED GY IP 250 OP 250 PS 637: Performed by: INTERNAL MEDICINE

## 2025-05-05 PROCEDURE — 85041 AUTOMATED RBC COUNT: CPT | Performed by: NURSE PRACTITIONER

## 2025-05-05 PROCEDURE — 82435 ASSAY OF BLOOD CHLORIDE: CPT | Performed by: NURSE PRACTITIONER

## 2025-05-05 PROCEDURE — 250N000013 HC RX MED GY IP 250 OP 250 PS 637: Performed by: STUDENT IN AN ORGANIZED HEALTH CARE EDUCATION/TRAINING PROGRAM

## 2025-05-05 PROCEDURE — 97530 THERAPEUTIC ACTIVITIES: CPT | Mod: GO | Performed by: OCCUPATIONAL THERAPIST

## 2025-05-05 PROCEDURE — 250N000013 HC RX MED GY IP 250 OP 250 PS 637: Performed by: HOSPITALIST

## 2025-05-05 PROCEDURE — 83735 ASSAY OF MAGNESIUM: CPT | Performed by: NURSE PRACTITIONER

## 2025-05-05 PROCEDURE — 99232 SBSQ HOSP IP/OBS MODERATE 35: CPT | Mod: FS | Performed by: STUDENT IN AN ORGANIZED HEALTH CARE EDUCATION/TRAINING PROGRAM

## 2025-05-05 PROCEDURE — 97535 SELF CARE MNGMENT TRAINING: CPT | Mod: GO | Performed by: OCCUPATIONAL THERAPIST

## 2025-05-05 PROCEDURE — 99232 SBSQ HOSP IP/OBS MODERATE 35: CPT | Performed by: INTERNAL MEDICINE

## 2025-05-05 PROCEDURE — 36415 COLL VENOUS BLD VENIPUNCTURE: CPT | Performed by: NURSE PRACTITIONER

## 2025-05-05 RX ORDER — FUROSEMIDE 20 MG/1
20 TABLET ORAL DAILY PRN
Status: DISCONTINUED | OUTPATIENT
Start: 2025-05-05 | End: 2025-05-07 | Stop reason: HOSPADM

## 2025-05-05 RX ORDER — FUROSEMIDE 40 MG/1
80 TABLET ORAL DAILY
Status: DISCONTINUED | OUTPATIENT
Start: 2025-05-05 | End: 2025-05-05

## 2025-05-05 RX ORDER — FUROSEMIDE 40 MG/1
40 TABLET ORAL DAILY
Status: DISCONTINUED | OUTPATIENT
Start: 2025-05-05 | End: 2025-05-07 | Stop reason: HOSPADM

## 2025-05-05 RX ADMIN — MIDODRINE HYDROCHLORIDE 10 MG: 10 TABLET ORAL at 09:55

## 2025-05-05 RX ADMIN — ATORVASTATIN CALCIUM 40 MG: 40 TABLET, FILM COATED ORAL at 20:28

## 2025-05-05 RX ADMIN — PANTOPRAZOLE SODIUM 40 MG: 40 TABLET, DELAYED RELEASE ORAL at 09:56

## 2025-05-05 RX ADMIN — MICONAZOLE NITRATE: 20 POWDER TOPICAL at 10:49

## 2025-05-05 RX ADMIN — MIDODRINE HYDROCHLORIDE 10 MG: 10 TABLET ORAL at 18:13

## 2025-05-05 RX ADMIN — APIXABAN 2.5 MG: 2.5 TABLET, FILM COATED ORAL at 20:28

## 2025-05-05 RX ADMIN — ASPIRIN 81 MG: 81 TABLET, COATED ORAL at 20:28

## 2025-05-05 RX ADMIN — APIXABAN 2.5 MG: 2.5 TABLET, FILM COATED ORAL at 09:58

## 2025-05-05 RX ADMIN — MICONAZOLE NITRATE: 20 POWDER TOPICAL at 20:31

## 2025-05-05 RX ADMIN — Medication 12.5 MG: at 09:57

## 2025-05-05 RX ADMIN — FUROSEMIDE 40 MG: 40 TABLET ORAL at 14:58

## 2025-05-05 RX ADMIN — MIDODRINE HYDROCHLORIDE 10 MG: 10 TABLET ORAL at 12:55

## 2025-05-05 ASSESSMENT — ACTIVITIES OF DAILY LIVING (ADL)
ADLS_ACUITY_SCORE: 43

## 2025-05-05 NOTE — PROGRESS NOTES
[facility]    Hospitalist Progress Note  Date of admit: 5/2/2025  HD#2; DOS: 5/4/2025     Assessment & Plan   Eric Poole is a 84 year old man who was admitted on 5/2/2025. PMH significant for chronic systolic heart failure w/ LVEF 20%, mild AVR and pacemaker/ICD, pulmonary hypertension w/ severe LETICIA , moderate COPD, PAF on Eliquis, hx secondary erythrocytosis (s/p therapeutic phlebotomies), BPH, chronic hypotension, and overactive bladder.  Patient presented for evaluation due to shortness of breath and report of abnormal lab (BNP markedly elevated).  Patient was establishing with new provider in [place] and pending outpatient cardiology referral as he had previously followed in [place].  On presentation the emergency department, patient was noting increased shortness of breath, exertional dyspnea, and lower extremity edema.  Denied any chest pain.  In ED was noted to have BNP 27K and was given 60 IV lasix x1 with good response. Trop minimally elevated at 26, EKG with afib but rate controlled. Of note, patient was admitted in [place] in March 2025 for heart failure exacerbation.  He had medications adjusted at that time due to hypotension, DIEGO, CHF.     Interval events:  West remains in place. Did have desaturation to 81% when ambulating with OT. Resolved after being placed on 10 L O2 and quickly weaned down to 2 L O2. Diuretics transitioned to oral Lasix.       Acute on chronic systolic HF, decompensated  Type 2 MI with elevated, stable troponin  Cardiomyopathy w/ EF 15-20% s/p ICD  -presented to establish with local physician and had abnormal lab. Presented to ED today and found to have BNP >27k with LE edema, sob, exertional dyspnea. Trop 26, but no CP. Trend trops and monitor on tele  -given 60 IV lasix x1 in ED and continue Lasix 40 mg IV twice daily. Spironolactone 12.5 mg daily, as well.   - Appreciate cardiology consultation for ongoing recommendations.  Patient prefers to follow  with [facility] here. [facility] clinic planning to see patient 5/6.  - GDMT rather limited due to chronic hypotension (on midodrine), intolerance of ACEi/ARB/BB and recurrent UTI on SGLT-2.  On + spironolactone, furosemide, ASA and atorvastatin.   - cardiac diet with 2L fluid restriction.      Atrial fibrillation  Chronic anticoagulation with Eliquis  Type II NSTEMI   -Digoxin discontinued  (CKD likely cardiorenal).  Per discussion with [place] -- if AF with AVR recurs, consider AVNA.  No longer on warfarin and is on apixaban.   -FEN: replete elytes PRN.  K goal >/= 4 and Mg goal >/= 2. Repeat BMP and Mg in the AM.     Pulmonary HTN  Severe LETICIA  Moderate COPD  History of chronic respiratory failure  - Has required O2 in the past. Monitor for requirements here. On no maintenance medications.   - Offer CPAP here.  Likely needs reevaluation as OP -- notes he has not used his equipment in ~2 years and that it was breaking at that time (8-10 year old equipment).   Pulmonary toilet.     Chronic hypotension  - Continue PTA midodrine.    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  -Continue diuresing as above.  Have transitioned to PO diuretics 5/5/25   -Renally dose medications and avoid nephrotoxins. Follow BMP.  -Continue strict I and O and daily weights.   - Consider nephrology referral, likely as outpatient.     BPH  Overactive bladder  History of urinary retention  - Admission med rec deleted Cardura and Proscar from current medications.    - Hold resuming above for now.   - Consider trial of void 5/6 morning. Patient may need to discharge with West cathter and outpatient urology evaluation.      GERD  -Continue PTA PPI.    Protein Calorie Malnutrition:  BMI: 22  - Appreciate dietician assistance.  Encourage PO intake.  Supplements.      DVT Prophylaxis: DOAC  Code Status: No CPR- Do NOT Intubate  Medically Ready for Discharge: Anticipated in 2-4 Days  Expected  discharge: Anticipate hospital stay at least 2 given tenuous fluid balance/diuresis.    Marilyn Rai MD   Hospitalist Service  [facility]    ------------------------------------------------------------------------------------------------    Interval History   Nursing reports that patient has been having episodes of some increased anxiety and inattention. Suspected delirium, though resolved at time of my exam. Patient denies any chest pain. Did have desaturation to 81% when ambulating with OT. Resolved after being placed on 10 L O2 and quickly weaned down to 2 L O2. Diuretics transitioned to oral Lasix.       Physical Exam   Temp: 97.7  F (36.5  C) Temp src: Oral BP: 106/69 Pulse: 59   Resp: 20 SpO2: 96 % O2 Device: Nasal cannula Oxygen Delivery: 2 LPM  Vitals:    05/02/25 2231 05/03/25 0314 05/05/25 0618   Weight: 71.9 kg (158 lb 8.2 oz) 72.6 kg (160 lb) 71.8 kg (158 lb 4.8 oz)     Vital Signs with Ranges  Temp:  [96.3  F (35.7  C)-97.7  F (36.5  C)] 97.7  F (36.5  C)  Pulse:  [59-84] 59  Resp:  [18-24] 20  BP: ()/(58-75) 106/69  SpO2:  [81 %-99 %] 96 %  I/O last 3 completed shifts:  In: 1130 [P.O.:1130]  Out: 1375 [Urine:1375]    Constitutional: Pleasant older gentleman seen sitting up in bed. Wife at bedside, knitting. Patient is alert and oriented x3. No acute distress.   HEENT: NCAT. EOMI. Moist oral mucosa.  Respiratory: Clear to auscultation bilaterally. No crackles or wheezes.  Cardiovascular: Regular rate; paced rhythm. No murmur. Venous stasis changes to lower extremities with 1+ edema bilaterally.  GI: Soft, nontender, nondistended.   Musculoskeletal: No gross deformities.   Neurologic: Alert and oriented x3. No focal neurologic deficits. Did not assess gait.      Medications   Current Facility-Administered Medications   Medication Dose Route Frequency Provider Last Rate Last Admin    Continuing ACE inhibitor/ARB/ARNI from home medication list OR ACE inhibitor/ARB/ARNI order  already placed during this visit   Does not apply DOES NOT GO TO Bentley Timmons DO        Continuing beta blocker from home medication list OR beta blocker order already placed during this visit   Does not apply DOES NOT GO TO Bentley Timmons DO         Current Facility-Administered Medications   Medication Dose Route Frequency Provider Last Rate Last Admin    apixaban ANTICOAGULANT (ELIQUIS) tablet 2.5 mg  2.5 mg Oral BID Wang Moeller MD   2.5 mg at 05/05/25 0958    aspirin EC tablet 81 mg  81 mg Oral QPM Marilyn Montez MD   81 mg at 05/04/25 2000    atorvastatin (LIPITOR) tablet 40 mg  40 mg Oral QPM Marilyn Montez MD   40 mg at 05/04/25 2000    furosemide (LASIX) tablet 40 mg  40 mg Oral Daily Cristina Aiken PA-C   40 mg at 05/05/25 1458    miconazole (MICATIN) 2 % powder   Topical BID Marilyn Montez MD   Given at 05/05/25 1049    midodrine (PROAMATINE) tablet 10 mg  10 mg Oral TID w/meals Bentley Henry, DO   10 mg at 05/05/25 1255    pantoprazole (PROTONIX) EC tablet 40 mg  40 mg Oral Daily Bentley Henry, DO   40 mg at 05/05/25 0956    spironolactone (ALDACTONE) half-tab 12.5 mg  12.5 mg Oral Daily Bentley Henry, DO   12.5 mg at 05/05/25 0957       Data   Recent Labs   Lab 05/05/25  0540 05/04/25  1801 05/04/25  0536 05/03/25  0516 05/02/25  2337 05/02/25  1847 05/02/25  1825   WBC 5.6  --  5.7 6.5  --   --  7.0   HGB 15.4  --  15.0 14.8  --   --  16.3   MCV 92  --  92 92  --   --  93     --  198 206  --   --  228   INR  --   --  1.65* 1.67* 1.59*   < >  --     138 139 140  --   --  140   POTASSIUM 4.1 4.1 4.1 4.3  --   --  4.5   CHLORIDE 101 101 103 102  --   --  101   CO2 29 25 26 28  --   --  28   BUN 29.1* 27.3* 28.2* 30.8*  --   --  35.2*   CR 1.11 1.13 1.13 1.23*  --   --  1.18*   ANIONGAP 9 12 10 10  --   --  11   ESTEFANÍA 8.4* 8.5* 8.7* 8.7*  --   --  9.0   GLC 91 104* 95 92  --   --  100*   ALBUMIN  --   --   --   --   --   --  3.8   PROTTOTAL   --   --   --   --   --   --  6.5   BILITOTAL  --   --   --   --   --   --  0.9   ALKPHOS  --   --   --   --   --   --  109   ALT  --   --   --   --   --   --  9   AST  --   --   --   --   --   --  24    < > = values in this interval not displayed.       No results found for this or any previous visit (from the past 24 hours).

## 2025-05-05 NOTE — PROGRESS NOTES
[facility]  Cardiology Progress Note  Date of Service: 05/05/2025  Primary Cardiologist: Previously Dr. Prater in [place]; Now Dr. Armenta given he recently moved back to [place]      Assessment & Plan    Eric Poole is a 84 year old male with HFrEF, ischemic cardiomyopathy s/p ICD, coronary artery disease permanent atrial fibrillation, history of hypertension now hypotensive on chronic midodrine, moderate COPD, CKD stage IIIb, dyslipidemia, peripheral vascular disease, pulmonary hypertension with severe LETICIA, overactive bladder, history of secondary erythrocytosis (s/p therapeutic phlebotomies), BPH, GERD, and history of noncompliance.  He was seen to get sutures removed 5/1/2025 in which he noted off-and-on dyspnea on exertion. He was admitted to internal medicine 5/2/2025 with cardiology consult for CHF exacerbation.     Assessment:  Acute on chronic HFrEF, ischemic cardiomyopathy s/p ICD - TTE 5/3/25 with EF 15-20%, severe global hypokinesia of LV, LV severely dilated, moderate 2+ MR, mild +1 TR and AR. Similar to prior 1/2025.   - Furosemide 40 mg daily with 20 mg as needed for weight gain. Weight today 158 lbs, I/Os 24 hours -330mL. Net -1.2L  - Limited GDMT due to hypotension on chronic midodrine. UTIs with SGLT2i.On spironolactone 12.5 mg daily   - [facility] consulted given multiple admissions, HFrEF   Type II MI - flat trajectory. Denies angina.   Coronary artery disease - cardiac catheterization 2018 with severe CAD, ASA, statin. Due to low BP on midodrine cannot tolerate beta blocker   Permanent atrial fibrillation - PTA Eliquis 2.5 mg twice daily; PTA digoxin discontinued   Pulmonary hypertension with severe LETICIA, moderate COPD, history of chronic respiratory failure - has been noncompliant with CPAP for ~2 years, noted at the time machine was breaking. Has required supplemental O2 in the past   Chronic hypotension - on chronic midodrine 10 mg three times daily   Intermittent Episodes of Confusion    CKD stage IIIb  BPH, overactive bladder  GERD   Former smoker - recommend continued cessation    Plan:   Will switch to furosemide 40 mg oral daily with as needed 20 mg for weight gain. He has had significant weight loss over the past few months along with reduced intake.   Continue spironolactone for GDMT. Unable to tolerate much GDMT due to hypotension on chronic midodrine. No SGLT2i due to UTIs.   Continue PTA Eliquis 2.5 mg twice daily. PTA digoxin discontinued.   Continue aspirin and statin   [facility] consulted given multiple admissions, HFrEF. Recently moved back to [place], establishing with cardiology here. Recommend follow up in 1-2 weeks with labs prior to appointment.     Cristina Aiken PA-C  [facility]    Interval History   Patient seen resting in bed at the time of visit, appears comfortable. Recently moved from [place]. Lives with wife. Denies any orthopnea or shortness of breath. Reports improvement in lower extremity edema. Denies chest pain, palpitations, lightheadedness, dizziness.     Physical Exam   Temp: 97.4  F (36.3  C) Temp src: Oral BP: 102/68 Pulse: 65   Resp: 18 SpO2: 94 % O2 Device: None (Room air)    Vitals:    05/02/25 2231 05/03/25 0314 05/05/25 0618   Weight: 71.9 kg (158 lb 8.2 oz) 72.6 kg (160 lb) 71.8 kg (158 lb 4.8 oz)       GEN: well nourished, in no acute distress.  HEENT:  Pupils equal, round. Sclerae nonicteric.   NECK: Supple, no masses appreciated. No JVD with patient.  C/V:  irregularly irregular rhythm, normal rate, no murmur, rub or gallop.   RESP: Respirations are unlabored. Clear to auscultation bilaterally without wheezing, rales, or rhonchi.  GI: Abdomen soft, nontender.  EXTREM: trace bilateral LE edema.  NEURO: Alert and oriented, cooperative.  SKIN: Warm and dry.     Medications   Current Facility-Administered Medications   Medication Dose Route Frequency Provider Last Rate Last Admin    Continuing ACE inhibitor/ARB/ARNI from home medication list OR  ACE inhibitor/ARB/ARNI order already placed during this visit   Does not apply DOES NOT GO TO Bentley Timmons DO        Continuing beta blocker from home medication list OR beta blocker order already placed during this visit   Does not apply DOES NOT GO TO Bentley Timmons DO         Current Facility-Administered Medications   Medication Dose Route Frequency Provider Last Rate Last Admin    apixaban ANTICOAGULANT (ELIQUIS) tablet 2.5 mg  2.5 mg Oral BID SunniWang moreno MD   2.5 mg at 05/04/25 2000    aspirin EC tablet 81 mg  81 mg Oral QPM Marilyn Montez MD   81 mg at 05/04/25 2000    atorvastatin (LIPITOR) tablet 40 mg  40 mg Oral QPM Marilyn Montez MD   40 mg at 05/04/25 2000    furosemide (LASIX) injection 40 mg  40 mg Intravenous bid 08 & 14 Bentley Henry, DO   40 mg at 05/04/25 1312    miconazole (MICATIN) 2 % powder   Topical BID Marilyn Montez MD   Given at 05/04/25 1958    midodrine (PROAMATINE) tablet 10 mg  10 mg Oral TID w/meals Bentley Henry, DO   10 mg at 05/04/25 1757    pantoprazole (PROTONIX) EC tablet 40 mg  40 mg Oral Daily Bentley Henry, DO   40 mg at 05/04/25 0929    spironolactone (ALDACTONE) half-tab 12.5 mg  12.5 mg Oral Daily Bentley Henry, DO   12.5 mg at 05/04/25 0927       Data   Last 24 hours labs reviewed     Tele:  underlying Afib 50-60s; 4b VT     Echo: 5/3/25  The visual ejection fraction is 15-20%.  There is severe global hypokinesia of the left ventricle.  The left ventricle is severely dilated.  There is a catheter/pacemaker lead seen in the right ventricle.  There is moderate (2+) mitral regurgitation.  There is mild (1+) tricuspid regurgitation.  There is mild (1+) aortic regurgitation.  There is no pericardial effusion.  Moderate left pleural effusion     Outside echo dated 1/20/25 with EF < 20%    Prior Echo 1/20/25  Severe left ventricular enlargement.   Severe left ventricular systolic dysfunction, ejection fraction estimated at    20%.   Elevated LV filling pressures.   Sclerotic aortic valve without significant stenosis.   Mild aortic valve regurgitation.   Mildly dilated left atrium.     Prior PET Cardiac Viability 12/21/18  Nuclear conclusions:     There is mild uptake of the lateral and distal anterior apical wall   suggesting some minimal viability.  The overall LV is severely dilated   with severe reduction in ejection fraction.     Prior Cardiac Catheterization 12/10/2018  1. Critical LAD/diagonal disease  Recommendations  I was unable to pass a wire into the true LAD. Will need to consider  revascularization with LIMA to the LAD versus high risk PCI and potential  atherectomy at a center that can provide more percutaneous support. The  patient is EF is severely reduced.     LMCA: No obstructive disease    LAD: The LAD has a 99% lesion at the bifurcation of a large diagonal artery  there is a significant atheroma with heavy calcification there was BERTIN 2  flow of the LAD. We attempted to wire the LAD. I was able to wire the  diagonal but we could not pass a wire into the LAD past the lesion    LCx: The left circumflex has no major obstructive disease, it is a dominant  artery    RCA: Small nondominant artery     Device: Dual Chamber [employer] ICD, implanted 6/11/2019

## 2025-05-05 NOTE — PLAN OF CARE
"Inpatient: 0700 - 1900    Dx: CHF Exacerbation       Orientation: This AM is oriented x4  Pain: Denies   Activity: Ax1 with Walker and Gait Belt   LDA: Right PIV SL - West in Place  Diet: Cardiac Diet with 2000 mL fluid restriction   Neuro: Displaying intermittent confusion   Cardio: Heart sounds are distant - has pacemaker/ICD - Weak dorsalis pedis pulses - Capillary refill better - Swelling looks improved today compared to prior days - at +1  Resp: Loose productive cough present - still having dyspnea upon exertion - seems to have more tachypnea today - lungs sounds clear  MS: Displays generalized weakness   GI: WDL  : Indwelling catheter placed 5/3  Derm: Overall pale and pan - Has scattered bruising, abrasions, and scabs more predominately on extremities. Mucous membranes dry. Rash to groin folds. Old wound to right shoulder's foam dressing is CDI. Redness noted to sacrum/coccy - blanchable - offloaded with pillow.   Plan: Cardiology following - Strict I&O's          Voiced frustration that he is still in the hospital - educated on care plan. Cardiology switched to oral furosemide         1230: Patient dyspneic upon exertion and unable to ambulate out of room with help of support staff - desaturated to 86%. Oxygen then labile on room air and placed on 2 LPM via NC. (Please see OT note for other desaturation event - desaturated upon ambulation to 81% and required 10 LPM to re-saturate)        Remains on Tele - Had 9 beat run of V-tach          Foam dressing CDI to shoulder.         Catheter care done - miconazole powder to groin folds.           Shift I&O's    I - 1490  O - 650      /69 (BP Location: Right arm)   Pulse 59   Temp 97.7  F (36.5  C) (Oral)   Resp 20   Ht 1.803 m (5' 11\")   Wt 71.8 kg (158 lb 4.8 oz)   SpO2 96%   BMI 22.08 kg/m             Problem: Adult Inpatient Plan of Care  Goal: Plan of Care Review  Description: The Plan of Care Review/Shift note should be completed every " "shift.  The Outcome Evaluation is a brief statement about your assessment that the patient is improving, declining, or no change.  This information will be displayed automatically on your shiftnote.  Outcome: Progressing  Flowsheets (Taken 5/5/2025 1034)  Outcome Evaluation: Cardio Following - No on oral Lasix  Plan of Care Reviewed With: patient  Overall Patient Progress: no change  Goal: Patient-Specific Goal (Individualized)  Description: You can add care plan individualizations to a care plan. Examples of Individualization might be:  \"Parent requests to be called daily at 9am for status\", \"I have a hard time hearing out of my right ear\", or \"Do not touch me to wake me up as it startlesme\".  Outcome: Progressing  Goal: Absence of Hospital-Acquired Illness or Injury  Outcome: Progressing  Intervention: Identify and Manage Fall Risk  Recent Flowsheet Documentation  Taken 5/5/2025 1002 by Yao Méndez, RN  Safety Promotion/Fall Prevention:   activity supervised   assistive device/personal items within reach   clutter free environment maintained   lighting adjusted   mobility aid in reach   nonskid shoes/slippers when out of bed   patient and family education   room organization consistent   safety round/check completed   supervised activity  Intervention: Prevent Skin Injury  Recent Flowsheet Documentation  Taken 5/5/2025 1002 by Yao Méndez RN  Body Position:   turned   left   position changed independently  Intervention: Prevent and Manage VTE (Venous Thromboembolism) Risk  Recent Flowsheet Documentation  Taken 5/5/2025 1002 by Yao Méndez RN  VTE Prevention/Management: SCDs off (sequential compression devices)  Goal: Optimal Comfort and Wellbeing  Outcome: Progressing  Intervention: Monitor Pain and Promote Comfort  Recent Flowsheet Documentation  Taken 5/5/2025 0953 by Yao Méndez RN  Pain Management Interventions: declines  Goal: Readiness for Transition of Care  Outcome: Progressing   "   Problem: Delirium  Goal: Optimal Coping  Outcome: Progressing  Goal: Improved Behavioral Control  Outcome: Progressing  Intervention: Minimize Safety Risk  Recent Flowsheet Documentation  Taken 5/5/2025 1002 by Yao Méndez RN  Enhanced Safety Measures:   pain management   patient/family teach back on injury risk   review medications for side effects with activity  Goal: Improved Attention and Thought Clarity  Outcome: Progressing  Goal: Improved Sleep  Outcome: Progressing     Problem: Fluid Volume Excess  Goal: Fluid Balance  Outcome: Progressing   Goal Outcome Evaluation:      Plan of Care Reviewed With: patient    Overall Patient Progress: no changeOverall Patient Progress: no change    Outcome Evaluation: Cardio Following - No on oral Lasix

## 2025-05-05 NOTE — PROGRESS NOTES
Care Management Follow Up    Length of Stay (days): 2    Expected Discharge Date: 05/06/2025     Concerns to be Addressed: all concerns addressed in this encounter     Patient plan of care discussed at interdisciplinary rounds: Yes    Anticipated Discharge Disposition:  Home Care  Anticipated Discharge Services:  Home Care w [facility]  Anticipated Discharge DME:      Patient/family educated on Medicare website which has current facility and service quality ratings: yes  Education Provided on the Discharge Plan: Yes  Patient/Family in Agreement with the Plan: yes    Referrals Placed by CM/SW: Homecare    Discussed  Partnership in Safe Discharge Planning  document with patient/family: No     Handoff Completed: No, handoff not indicated or clinically appropriate    Additional Information:  [facility] accepted for  RN/PT/OT, contact info added to AVS.    Next Steps: send home care orders at discharge    Stella Rangel RN, BSN  Inpatient Care Coordination  [facility]  [phone]

## 2025-05-05 NOTE — PROGRESS NOTES
"CLINICAL NUTRITION SERVICES - ASSESSMENT NOTE    RECOMMENDATIONS FOR MDs/PROVIDERS TO ORDER:  None at this time    Registered Dietitian Interventions:  Encouraged po intakes + emphasis on protein w/ each meal to support nutritional needs  Ordered Ensure Plus HP (C) w/ lunch daily    Future/Additional Recommendations:  Oral intake encouragement appreciated  Monitor po adequacy, weight trending, nutrition-related labs/meds     REASON FOR ASSESSMENT  Positive admission nutrition risk screen    Nutrition screen:   Have you recently lost weight without trying - \"Yes, [how much] 14-23#\"  Have you been eating poorly because of a decreased appetite - \"Yes\"    Per chart, patient is admitted for CHF exacerbation.     PMH: chronic systolic heart failure w/ LVEF 20%, mild AVR and pacemaker/ICD, pulmonary hypertension w/ severe LETICIA , moderate COPD, PAF on coumadin, hx secondary erythrocytosis (s/p therapeutic phlebotomies), BPH, chronic hypotension, and overactive bladder    INFORMATION OBTAINED  Assessed patient in room.    NUTRITION HISTORY  Information obtained from patient and spouse Garima in room. Conversation with pt slightly difficult to follow. Pt endorses recent weight loss since February ('25), he tells me he's gone from 213 to 154# (see wt hx). Spouse indicates they were in [place] for the winter and pt's intakes were poor d/t eating out a lot and disliking how salty the restaurant food was. Reportedly, eating has improved since returning to [place]. Currently, a typical day of eating is as follows: 1 \"big\" meal (usually dinner) + 2 \"snack-size\" meals. NKFA. No current issues with chewing or swallowing. Takes Vitamin D3 daily at home. Agreeable to start daily oral nutrition supplement to support po intakes.     CURRENT NUTRITION ORDERS  Diet: 2 g Sodium and 2000 mL Fluid Restriction    CURRENT INTAKE/TOLERANCE  - 5/5: 100% x1 so far (401 kcal and 31 g pro)  - 5/4: 75/25/50 (daily order total = 1904 kcal and 83 g " "pro)  - 5/3: 100/100/75 (daily order total = 1493 kcal and 78 g pro)    LABS  Nutrition-relevant labs:  BUN 29.1 (H) Electrolytes WNL, BG 91    MEDICATIONS  Nutrition-relevant medications:  Lasix BID, Protonix, Spironolactone    ANTHROPOMETRICS  Height: 180.3 cm (5' 11\")  Admission Weight: 71.9 kg (158 lb 8.2 oz) (05/02/25 2231)   Most Recent Weight: 71.8 kg (158 lb 4.8 oz) (05/05/25 0618)  IBW: 78.1 kg  BMI: Body mass index is 22.08 kg/m .   Weight History: Down 27# in ~5 months (14.5%)  Wt Readings from Last 10 Encounters:   05/05/25 71.8 kg (158 lb 4.8 oz)   06/04/19 96.6 kg (213 lb)   08/01/18 99.8 kg (220 lb)   10/20/15 103.4 kg (228 lb)   10/09/15 102.7 kg (226 lb 8 oz)   06/04/15 100 kg (220 lb 8 oz)   09/17/14 100 kg (220 lb 8 oz)   06/03/14 101.2 kg (223 lb)   09/25/13 101.2 kg (223 lb)   06/17/13 99.2 kg (218 lb 11.2 oz)     Chart review:   3/25/25: 69.9 kg (154#)   3/5/25: 72.1 kg (159#)   12/27/24: 89.8 kg (198#)   12/13/24: 83.9 kg (185#)     Dosing Weight: 71.8 kg, based on actual wt    ASSESSED NUTRITION NEEDS  Estimated Energy Needs: 0113-4908 kcals/day (30 - 35 kcals/kg)  Justification: Increased needs (CHF, COPD, repletion)  Estimated Protein Needs:  grams protein/day (1.3 - 1.8 grams of pro/kg)  Justification:  Increased needs (preservation of LBM, CHF, COPD)  Estimated Fluid Needs: 2000 mL/day ( per MD )  Justification: On a fluid restriction    SYSTEM AND PHYSICAL FINDINGS    GI symptoms:  Reviewed. Last BM 5/3.  Skin/wounds:  Reviewed. No pressure injuries indicated. 1-2+ BLE edema, diuresing.    MALNUTRITION  % Intake: < 75% for >/= 1 month (moderate) - per report  % Weight Loss: > 10% in 6 months (severe)   Subcutaneous Fat Loss: Orbital: Moderate, Buccal: Severe, and Fat overlying the ribs: Mild  Muscle Loss: Temples (temporalis muscle): Mild, Clavicles (pectoralis and deltoids): Moderate, Shoulders (deltoids): Moderate, and Interosseous muscles: Moderate  Fluid Accumulation/Edema: " Mild, 1+  Malnutrition Diagnosis: Moderate malnutrition in the context of chronic illness  Malnutrition Present on Admission: Yes    NUTRITION DIAGNOSIS  Malnutrition (undernutrition) related to chronic illness as evidenced by <75% intake compared to needs for >/= 1 month (per report), 14.5% weight loss in ~5 months, at least moderate fat loss, mild-moderate muscle loss, and mild (1-2+) BLE edema.     INTERVENTIONS  See nutrition interventions above    GOALS  Patient to consume % of nutritionally adequate meal trays TID + 100% daily Ensure.     MONITORING/EVALUATION  Progress toward goals will be monitored and evaluated per policy.    Linh Alvarez RD, LD  Available on [employer]

## 2025-05-05 NOTE — PLAN OF CARE
"PRIMARY DIAGNOSIS: SOB, LE EDEMA  OUTPATIENT/OBSERVATION GOALS TO BE MET BEFORE DISCHARGE:  ADLs back to baseline: Yes    Activity and level of assistance: Assist of 1 with RW/GB    Pain status: Pain free.    Return to near baseline physical activity:  up with assist of 1      Discharge Planner Nurse   Safe discharge environment identified: Yes  Barriers to discharge: Yes       Entered by: Alva Carpenter RN 05/05/2025 6:25 AM   Patient alert and oriented x3- disoriented to time and intermittently to situation. Vitally stable on room air. Continuous pulse oximeter in place. Patient denies pain. Up assist of 1 with rolling walker and gait belt. Edema to bilateral lower extremities- elevated overnight. On cardiac telemetry- V-paced per telemetry tech. West catheter in place- cares provided. Tolerating diet. On 2,000 mL fluid restriction. No bowel movement this shift. Right peripheral IV saline locked.     Please review provider order for any additional goals.   Nurse to notify provider when observation goals have been met and patient is ready for discharge.      Goal Outcome Evaluation:      Plan of Care Reviewed With: patient    Overall Patient Progress: no changeOverall Patient Progress: no change    Outcome Evaluation: Denies pain.      Problem: Adult Inpatient Plan of Care  Goal: Plan of Care Review  Description: The Plan of Care Review/Shift note should be completed every shift.  The Outcome Evaluation is a brief statement about your assessment that the patient is improving, declining, or no change.  This information will be displayed automatically on your shiftnote.  Outcome: Progressing  Flowsheets (Taken 5/5/2025 0118)  Outcome Evaluation: Denies pain.  Plan of Care Reviewed With: patient  Overall Patient Progress: no change  Goal: Patient-Specific Goal (Individualized)  Description: You can add care plan individualizations to a care plan. Examples of Individualization might be:  \"Parent requests to be called " "daily at 9am for status\", \"I have a hard time hearing out of my right ear\", or \"Do not touch me to wake me up as it startlesme\".  Outcome: Progressing  Goal: Absence of Hospital-Acquired Illness or Injury  Outcome: Progressing  Intervention: Identify and Manage Fall Risk  Recent Flowsheet Documentation  Taken 5/4/2025 1945 by Alva Carpenter RN  Safety Promotion/Fall Prevention:   activity supervised   assistive device/personal items within reach   clutter free environment maintained   lighting adjusted   mobility aid in reach   nonskid shoes/slippers when out of bed   patient and family education   room organization consistent   safety round/check completed   supervised activity  Intervention: Prevent Skin Injury  Recent Flowsheet Documentation  Taken 5/4/2025 1945 by Alva Carpenter RN  Body Position:   position changed independently   legs elevated  Intervention: Prevent and Manage VTE (Venous Thromboembolism) Risk  Recent Flowsheet Documentation  Taken 5/4/2025 1945 by Alva Carpenter RN  VTE Prevention/Management: SCDs off (sequential compression devices)  Intervention: Prevent Infection  Recent Flowsheet Documentation  Taken 5/4/2025 1945 by Alva Carpenter RN  Infection Prevention:   hand hygiene promoted   rest/sleep promoted  Goal: Optimal Comfort and Wellbeing  Outcome: Progressing  Goal: Readiness for Transition of Care  Outcome: Progressing     Problem: Delirium  Goal: Optimal Coping  Outcome: Progressing  Goal: Improved Behavioral Control  Outcome: Progressing  Intervention: Minimize Safety Risk  Recent Flowsheet Documentation  Taken 5/4/2025 1945 by Alva Carpenter RN  Enhanced Safety Measures:   pain management   patient/family teach back on injury risk   review medications for side effects with activity  Goal: Improved Attention and Thought Clarity  Outcome: Progressing  Goal: Improved Sleep  Outcome: Progressing     Problem: Fluid Volume Excess  Goal: Fluid Balance  Outcome: Progressing     "

## 2025-05-06 ENCOUNTER — APPOINTMENT (OUTPATIENT)
Dept: OCCUPATIONAL THERAPY | Facility: CLINIC | Age: 85
DRG: 280 | End: 2025-05-06
Payer: COMMERCIAL

## 2025-05-06 ENCOUNTER — APPOINTMENT (OUTPATIENT)
Dept: PHYSICAL THERAPY | Facility: CLINIC | Age: 85
DRG: 280 | End: 2025-05-06
Attending: INTERNAL MEDICINE
Payer: COMMERCIAL

## 2025-05-06 ENCOUNTER — PATIENT OUTREACH (OUTPATIENT)
Dept: CARDIOLOGY | Facility: CLINIC | Age: 85
End: 2025-05-06
Payer: COMMERCIAL

## 2025-05-06 DIAGNOSIS — I50.21 ACUTE SYSTOLIC HEART FAILURE (H): Primary | ICD-10-CM

## 2025-05-06 LAB
ANION GAP SERPL CALCULATED.3IONS-SCNC: 8 MMOL/L (ref 7–15)
BASOPHILS # BLD AUTO: 0 10E3/UL (ref 0–0.2)
BASOPHILS NFR BLD AUTO: 1 %
BUN SERPL-MCNC: 33.3 MG/DL (ref 8–23)
CALCIUM SERPL-MCNC: 8.6 MG/DL (ref 8.8–10.4)
CHLORIDE SERPL-SCNC: 99 MMOL/L (ref 98–107)
CREAT SERPL-MCNC: 1.08 MG/DL (ref 0.67–1.17)
EGFRCR SERPLBLD CKD-EPI 2021: 67 ML/MIN/1.73M2
EOSINOPHIL # BLD AUTO: 0.1 10E3/UL (ref 0–0.7)
EOSINOPHIL NFR BLD AUTO: 2 %
ERYTHROCYTE [DISTWIDTH] IN BLOOD BY AUTOMATED COUNT: 16.8 % (ref 10–15)
GLUCOSE SERPL-MCNC: 85 MG/DL (ref 70–99)
HCO3 SERPL-SCNC: 28 MMOL/L (ref 22–29)
HCT VFR BLD AUTO: 46.3 % (ref 40–53)
HGB BLD-MCNC: 15.1 G/DL (ref 13.3–17.7)
IMM GRANULOCYTES # BLD: 0 10E3/UL
IMM GRANULOCYTES NFR BLD: 1 %
LYMPHOCYTES # BLD AUTO: 1.1 10E3/UL (ref 0.8–5.3)
LYMPHOCYTES NFR BLD AUTO: 19 %
MAGNESIUM SERPL-MCNC: 1.8 MG/DL (ref 1.7–2.3)
MCH RBC QN AUTO: 30.3 PG (ref 26.5–33)
MCHC RBC AUTO-ENTMCNC: 32.6 G/DL (ref 31.5–36.5)
MCV RBC AUTO: 93 FL (ref 78–100)
MONOCYTES # BLD AUTO: 0.5 10E3/UL (ref 0–1.3)
MONOCYTES NFR BLD AUTO: 9 %
NEUTROPHILS # BLD AUTO: 3.9 10E3/UL (ref 1.6–8.3)
NEUTROPHILS NFR BLD AUTO: 69 %
NRBC # BLD AUTO: 0 10E3/UL
NRBC BLD AUTO-RTO: 0 /100
PHOSPHATE SERPL-MCNC: 3 MG/DL (ref 2.5–4.5)
PLATELET # BLD AUTO: 177 10E3/UL (ref 150–450)
POTASSIUM SERPL-SCNC: 4.1 MMOL/L (ref 3.4–5.3)
RBC # BLD AUTO: 4.98 10E6/UL (ref 4.4–5.9)
SODIUM SERPL-SCNC: 135 MMOL/L (ref 135–145)
WBC # BLD AUTO: 5.7 10E3/UL (ref 4–11)

## 2025-05-06 PROCEDURE — 97161 PT EVAL LOW COMPLEX 20 MIN: CPT | Mod: GP

## 2025-05-06 PROCEDURE — 99232 SBSQ HOSP IP/OBS MODERATE 35: CPT | Mod: FS | Performed by: STUDENT IN AN ORGANIZED HEALTH CARE EDUCATION/TRAINING PROGRAM

## 2025-05-06 PROCEDURE — 36415 COLL VENOUS BLD VENIPUNCTURE: CPT | Performed by: INTERNAL MEDICINE

## 2025-05-06 PROCEDURE — 82565 ASSAY OF CREATININE: CPT | Performed by: INTERNAL MEDICINE

## 2025-05-06 PROCEDURE — 84100 ASSAY OF PHOSPHORUS: CPT | Performed by: INTERNAL MEDICINE

## 2025-05-06 PROCEDURE — 83735 ASSAY OF MAGNESIUM: CPT | Performed by: INTERNAL MEDICINE

## 2025-05-06 PROCEDURE — 97535 SELF CARE MNGMENT TRAINING: CPT | Mod: GO

## 2025-05-06 PROCEDURE — 99232 SBSQ HOSP IP/OBS MODERATE 35: CPT | Performed by: INTERNAL MEDICINE

## 2025-05-06 PROCEDURE — 250N000013 HC RX MED GY IP 250 OP 250 PS 637: Performed by: INTERNAL MEDICINE

## 2025-05-06 PROCEDURE — 85025 COMPLETE CBC W/AUTO DIFF WBC: CPT | Performed by: INTERNAL MEDICINE

## 2025-05-06 PROCEDURE — 97530 THERAPEUTIC ACTIVITIES: CPT | Mod: GP

## 2025-05-06 PROCEDURE — 120N000001 HC R&B MED SURG/OB

## 2025-05-06 PROCEDURE — 250N000013 HC RX MED GY IP 250 OP 250 PS 637: Performed by: HOSPITALIST

## 2025-05-06 PROCEDURE — 250N000013 HC RX MED GY IP 250 OP 250 PS 637: Performed by: STUDENT IN AN ORGANIZED HEALTH CARE EDUCATION/TRAINING PROGRAM

## 2025-05-06 PROCEDURE — 97530 THERAPEUTIC ACTIVITIES: CPT | Mod: GO

## 2025-05-06 RX ORDER — HALOPERIDOL 0.5 MG/1
0.5 TABLET ORAL EVERY 6 HOURS PRN
Status: DISCONTINUED | OUTPATIENT
Start: 2025-05-06 | End: 2025-05-07 | Stop reason: HOSPADM

## 2025-05-06 RX ADMIN — MIDODRINE HYDROCHLORIDE 10 MG: 10 TABLET ORAL at 11:34

## 2025-05-06 RX ADMIN — APIXABAN 2.5 MG: 2.5 TABLET, FILM COATED ORAL at 19:25

## 2025-05-06 RX ADMIN — PANTOPRAZOLE SODIUM 40 MG: 40 TABLET, DELAYED RELEASE ORAL at 08:23

## 2025-05-06 RX ADMIN — MIDODRINE HYDROCHLORIDE 10 MG: 10 TABLET ORAL at 18:07

## 2025-05-06 RX ADMIN — APIXABAN 2.5 MG: 2.5 TABLET, FILM COATED ORAL at 08:24

## 2025-05-06 RX ADMIN — MICONAZOLE NITRATE: 20 POWDER TOPICAL at 08:53

## 2025-05-06 RX ADMIN — Medication 12.5 MG: at 08:24

## 2025-05-06 RX ADMIN — MICONAZOLE NITRATE: 20 POWDER TOPICAL at 19:25

## 2025-05-06 RX ADMIN — MIDODRINE HYDROCHLORIDE 10 MG: 10 TABLET ORAL at 08:24

## 2025-05-06 RX ADMIN — FUROSEMIDE 40 MG: 40 TABLET ORAL at 08:24

## 2025-05-06 RX ADMIN — ATORVASTATIN CALCIUM 40 MG: 40 TABLET, FILM COATED ORAL at 19:25

## 2025-05-06 RX ADMIN — ASPIRIN 81 MG: 81 TABLET, COATED ORAL at 19:25

## 2025-05-06 ASSESSMENT — ACTIVITIES OF DAILY LIVING (ADL)
ADLS_ACUITY_SCORE: 47
ADLS_ACUITY_SCORE: 48
ADLS_ACUITY_SCORE: 47
ADLS_ACUITY_SCORE: 48
ADLS_ACUITY_SCORE: 47
ADLS_ACUITY_SCORE: 48
ADLS_ACUITY_SCORE: 47
ADLS_ACUITY_SCORE: 48
ADLS_ACUITY_SCORE: 47

## 2025-05-06 NOTE — PLAN OF CARE
"INPATIENT NOTE:    Patient is alert to self with intermittent confusion. Room air, sats >90, continuous pulse ox. Denies pain, sob, dizziness, nausea. Tele on, V paced 60s. Daily weight. Strict input& output. Oral diuresis daily. West catheter in place, clear output. Micatin powder applied to groin redness. 2g Na diet, 2000mls fluid restriction. Ambulates with assist of 1, gait belt,& walker. Plan to discharge home with home care, [facility] referral.      Goal Outcome Evaluation:      Plan of Care Reviewed With: patient    Overall Patient Progress: improvingOverall Patient Progress: improving       Problem: Adult Inpatient Plan of Care  Goal: Plan of Care Review  Description: The Plan of Care Review/Shift note should be completed every shift.  The Outcome Evaluation is a brief statement about your assessment that the patient is improving, declining, or no change.  This information will be displayed automatically on your shiftnote.  Outcome: Progressing  Flowsheets (Taken 5/6/2025 0026)  Plan of Care Reviewed With: patient  Overall Patient Progress: improving  Goal: Patient-Specific Goal (Individualized)  Description: You can add care plan individualizations to a care plan. Examples of Individualization might be:  \"Parent requests to be called daily at 9am for status\", \"I have a hard time hearing out of my right ear\", or \"Do not touch me to wake me up as it startlesme\".  Outcome: Progressing  Goal: Absence of Hospital-Acquired Illness or Injury  Outcome: Progressing  Intervention: Identify and Manage Fall Risk  Recent Flowsheet Documentation  Taken 5/5/2025 2026 by Wagner Mccain RN  Safety Promotion/Fall Prevention:   activity supervised   safety round/check completed   clutter free environment maintained   nonskid shoes/slippers when out of bed   patient and family education   room organization consistent  Intervention: Prevent Skin Injury  Recent Flowsheet Documentation  Taken 5/5/2025 2026 by " Wagner Mccain RN  Body Position: position changed independently  Intervention: Prevent and Manage VTE (Venous Thromboembolism) Risk  Recent Flowsheet Documentation  Taken 5/5/2025 2026 by Wagner Mccain RN  VTE Prevention/Management: SCDs off (sequential compression devices)  Intervention: Prevent Infection  Recent Flowsheet Documentation  Taken 5/5/2025 2026 by Wagner Mccain RN  Infection Prevention:   hand hygiene promoted   rest/sleep promoted   cohorting utilized  Goal: Optimal Comfort and Wellbeing  Outcome: Progressing  Intervention: Provide Person-Centered Care  Recent Flowsheet Documentation  Taken 5/5/2025 2026 by Wagner Mccain RN  Trust Relationship/Rapport:   care explained   reassurance provided  Goal: Readiness for Transition of Care  Outcome: Progressing     Problem: Delirium  Goal: Optimal Coping  Outcome: Progressing  Goal: Improved Behavioral Control  Outcome: Progressing  Intervention: Minimize Safety Risk  Recent Flowsheet Documentation  Taken 5/5/2025 2026 by Wagner Mccain RN  Enhanced Safety Measures: review medications for side effects with activity  Trust Relationship/Rapport:   care explained   reassurance provided  Goal: Improved Attention and Thought Clarity  Outcome: Progressing  Intervention: Maximize Cognitive Function  Recent Flowsheet Documentation  Taken 5/5/2025 2026 by Wagner Mccain RN  Sensory Stimulation Regulation:   quiet environment promoted   care clustered  Reorientation Measures:   clock in view   reorientation provided  Goal: Improved Sleep  Outcome: Progressing     Problem: Fluid Volume Excess  Goal: Fluid Balance  Outcome: Progressing

## 2025-05-06 NOTE — PROGRESS NOTES
"SPIRITUAL HEALTH SERVICES - Progress Note  Obs 2    Referral Source/ Reason for Visit: Length of stay visit for emotional support.    Summary and Recommendations -     Compassionately listened to patient, his wife and son share patient's present circumstances.    After more than ten years in [place], \"Snow-birding year round;\" volunteering in [employer] and enjoying other pursuits, patient's decline in health necessitated a move back to this area to live near their two daughters and son at [facility].    Plan: He is scheduled to discharge today.  No additional needs. [facility] remains available upon request.    Rev. MARISA Moreno.  Staff     [facility] available 24/7 for emergent requests/ referrals, either by paging the on-call  or by entering an ASAP/STAT consult in [employer], which will also page the on-call .      Assessment    Saw pt Eric Poole regarding length of stay visit for emotional support.    Patient/Family Understanding of Illness and Goals of Care - Patient understands that he was admitted to  due to complications from chronic heart failure.  Medical record also lists pulmonary hypertension, COPD, hypotension, GERD, malnutrition and an overactive bladder.    Distress and Loss - Patient is worried that he will need to have a catheter, \"forever.\"  He is also concerned about his ability to ambulate independently.    Strengths, Coping and Resources - He named his wife, son and two daughters as sources of support.    Meaning, Beliefs and Spirituality - Patient is [employer].  He has no home Gn[employer] at this time.        "

## 2025-05-06 NOTE — Clinical Note
Pt to see SD 5/13 @ 12:40 PM.  Go over education.  Switch to enroll vs maintenance (if able to see pt face to face).  Give intro letter.  Set next pt outreach track date.

## 2025-05-06 NOTE — PROGRESS NOTES
05/06/25 1238   Appointment Info   Signing Clinician's Name / Credentials (PT) Isela Rodríguez DPT   Living Environment   People in Home spouse   Current Living Arrangements assisted living   Home Accessibility no concerns   Transportation Anticipated family or friend will provide   Living Environment Comments Pt recently moved into [facility] from [place]. Pt reports 1st level unit, all accessible.   Self-Care   Usual Activity Tolerance good   Current Activity Tolerance fair   Regular Exercise No   Equipment Currently Used at Home walker, rolling   Fall history within last six months yes   Number of times patient has fallen within last six months 2   Activity/Exercise/Self-Care Comment Pt reports using 4WW for mobility. Recently moved to [facility] but reports no services at this time. Pt reports IND at baseline. Had 2 falls in summer, needing A to get up.   General Information   Onset of Illness/Injury or Date of Surgery 05/02/25   Referring Physician Marilyn Montez MD   Patient/Family Therapy Goals Statement (PT) get stronger   Pertinent History of Current Problem (include personal factors and/or comorbidities that impact the POC) Eric Poole is a 84 year old man who was admitted on 5/2/2025. PMH significant for chronic systolic heart failure w/ LVEF 20%, mild AVR and pacemaker/ICD, pulmonary hypertension w/ severe LETICIA , moderate COPD, PAF on Eliquis, hx secondary erythrocytosis (s/p therapeutic phlebotomies), BPH, chronic hypotension, and overactive bladder.  Patient presented for evaluation due to shortness of breath and report of abnormal lab (BNP markedly elevated).  Patient was establishing with new provider in [place] and pending outpatient cardiology referral as he had previously followed in [place].  On presentation the emergency department, patient was noting increased shortness of breath, exertional dyspnea, and lower extremity edema.  Denied any chest pain.  In ED was noted to have BNP  27K and was given 60 IV lasix x1 with good response. Trop minimally elevated at 26, EKG with afib but rate controlled. Of note, patient was admitted in [place] in March 2025 for heart failure exacerbation.  He had medications adjusted at that time due to hypotension, DIEGO, CHF.   Existing Precautions/Restrictions fall   Cognition   Affect/Mental Status (Cognition) WFL   Follows Commands (Cognition) WFL   Pain Assessment   Patient Currently in Pain No   Integumentary/Edema   Integumentary/Edema Comments age related changes, various brusiing, see nursing notes   Posture    Posture Forward head position;Protracted shoulders   Range of Motion (ROM)   Range of Motion ROM is WFL   Strength (Manual Muscle Testing)   Strength (Manual Muscle Testing) Deficits observed during functional mobility   Bed Mobility   Comment, (Bed Mobility) supine<>sit with CGA   Transfers   Comment, (Transfers) sit<>stand with FWW and mod A   Gait/Stairs (Locomotion)   Comment, (Gait/Stairs) unable to tolerate; dizziness in standing   Balance   Balance Comments impaired; needing FWW and Ryan   Clinical Impression   Criteria for Skilled Therapeutic Intervention Yes, treatment indicated   PT Diagnosis (PT) impaired functional mobiltiy   Influenced by the following impairments weakness, dizziness, deconditioning, impaired balance   Functional limitations due to impairments difficulty with bed mobility, transfers, ambulation   Clinical Presentation (PT Evaluation Complexity) stable   Clinical Presentation Rationale clinical judgement   Clinical Decision Making (Complexity) low complexity   Planned Therapy Interventions (PT) balance training;bed mobility training;gait training;home exercise program;neuromuscular re-education;patient/family education;strengthening;ROM (range of motion);transfer training;progressive activity/exercise;risk factor education;home program guidelines   Risk & Benefits of therapy have been explained evaluation/treatment results  reviewed;care plan/treatment goals reviewed;risks/benefits reviewed;current/potential barriers reviewed;participants voiced agreement with care plan;participants included;patient;spouse/significant other   PT Total Evaluation Time   PT Eval, Low Complexity Minutes (40639) 8   Physical Therapy Goals   PT Frequency 5x/week   PT Predicted Duration/Target Date for Goal Attainment 05/13/25   PT Goals Bed Mobility;Transfers;Gait   PT: Bed Mobility Modified independent;Supine to/from sit   PT: Transfers Modified independent;Sit to/from stand;Assistive device   PT: Gait Modified independent;Assistive device;150 feet   PT Discharge Planning   PT Plan PT: repeated STS, trial gait with 4WW (uses at home), monitor O2 and HR   PT Discharge Recommendation (DC Rec) Transitional Care Facility   PT Rationale for DC Rec Patient is below baseline functional mobility. Pt reports IND with mobility using 4WW at baseline. CUrrently pt needing up to mod assist to transfer to standing, unable to tolerate ambulation at this time d/t dizziness. SPO2 ~89% with standing on RA, HR ranging 45-75bpm. Pt limited by weakness, deconditioning, dizziness, and impaired balance. Rec TCU to progress strength and IND mobility.   PT Brief overview of current status A x 1 FWW, monitor O2/dizziness   PT Total Distance Amb During Session (feet) 0   Physical Therapy Time and Intention   Total Session Time (sum of timed and untimed services) 8

## 2025-05-06 NOTE — CONSULTS
Patient has Medicare Advantage through are    Jardiance/Farxiga  --$35/mo ($70 for 3 mo at [facility]).    Entresto  --$35/mo ($70 for 3 mo at [facility]).    If/when total out of pocket drug costs exceed $2000, patient will pay $0 for all covered drugs for the remainder of the year.    Doris Moss  Pharmacy Technician/Liaison, Discharge Pharmacy   [phone] (voice or text)  [email or web address]  Pharmacy test claims are estimates and may not reflect final costs.   Suggested alternatives aim to be cost-effective but may not be therapeutically equivalent as this consult is informational and does not constitute medical advice.   Clinical decisions should be made by qualified healthcare providers.

## 2025-05-06 NOTE — PROGRESS NOTES
Care Management Follow Up    Length of Stay (days): 3    Expected Discharge Date: 05/06/2025     Concerns to be Addressed: discharge planning   Patient plan of care discussed at interdisciplinary rounds: Yes    Anticipated Discharge Disposition:  TCU    Anticipated Discharge Services:    Anticipated Discharge DME:      Patient/family educated on Medicare website which has current facility and service quality ratings: yes  Education Provided on the Discharge Plan: Yes  Patient/Family in Agreement with the Plan: yes    Referrals Placed by CM/SW: Homecare,  Post acute facilities   Private pay costs discussed: Not applicable    Discussed  Partnership in Safe Discharge Planning  document with patient/family: No     Handoff Completed: No, handoff not indicated or clinically appropriate    Additional Information:  1:32 PM   MARGARITO discussed with provider who states patient's son is primary medical contact. Per her discussion with patient's son, family is hoping patient can get into TCU at [facility] as they live in the [facility]. SW sent referral.    2:04 PM   Patient accepted at [facility] for tomorrow, 5/7. Family will transport at 12pm.     Next Steps: CM to follow up on discharge coordination       KYLE Valdez   Social Work Care Manager   [facility]   [phone]

## 2025-05-06 NOTE — PROGRESS NOTES
[facility]  Cardiology Progress Note  Date of Service: 05/06/2025  Primary Cardiologist: Previously Dr. Prater in [place]; Now Dr. Armenta given he recently moved back to [place]      Assessment & Plan    Eric Poole is a 84 year old male with HFrEF, ischemic cardiomyopathy s/p ICD, coronary artery disease permanent atrial fibrillation, history of hypertension now hypotensive on chronic midodrine, moderate COPD, CKD stage IIIb, dyslipidemia, peripheral vascular disease, pulmonary hypertension with severe LETICIA, overactive bladder, history of secondary erythrocytosis (s/p therapeutic phlebotomies), BPH, GERD, and history of noncompliance.  He was seen to get sutures removed 5/1/2025 in which he noted off-and-on dyspnea on exertion. He was admitted to internal medicine 5/2/2025 with cardiology consult for CHF exacerbation.     Assessment:  Acute on chronic HFrEF, ischemic cardiomyopathy s/p ICD - TTE 5/3/25 with EF 15-20%, severe global hypokinesia of LV, LV severely dilated, moderate 2+ MR, mild +1 TR and AR. Similar to prior 1/2025.   - Furosemide 40 mg daily with 20 mg as needed for weight gain. No weight today, weight yesterday 158 lbs. I/Os 24 hours +240 mL. Net -880 mL  - Limited GDMT due to hypotension on chronic midodrine. UTIs with SGLT2i.On spironolactone 12.5 mg daily   - CORE consulted given multiple admissions, HFrEF   Type II MI - flat trajectory. Denies angina.   NSVT - ICD in place. Asymptomatic.   Coronary artery disease - cardiac catheterization 2018 with severe CAD, ASA, statin. Due to low BP on midodrine cannot tolerate beta blocker   Permanent atrial fibrillation - PTA Eliquis 2.5 mg twice daily; PTA digoxin discontinued   Pulmonary hypertension with severe LETICIA, moderate COPD, history of chronic respiratory failure - has been noncompliant with CPAP for ~2 years, noted at the time machine was breaking. Has required supplemental O2 in the past; required supplemental O2 last night    Chronic hypotension - on chronic midodrine 10 mg three times daily   Intermittent Episodes of Confusion   CKD stage IIIb  BPH, overactive bladder  GERD   Former smoker - recommend continued cessation    Plan:   Continue furosemide 40 mg oral daily with as needed 20 mg for weight gain. He has had significant weight loss over the past few months along with reduced intake.   Continue spironolactone for GDMT. Unable to tolerate much GDMT due to hypotension on chronic midodrine. No SGLT2i due to UTIs.   Continue PTA Eliquis 2.5 mg twice daily. PTA digoxin discontinued, had been recommended if patient goes into Afib RVR consider AVNA.   Continue aspirin and statin   CORE consulted given multiple admissions, HFrEF. Recently moved back to [place], establishing with cardiology here. Recommend follow up in 1-2 weeks with labs prior to appointment.   Cardiology will sign off, please call us with any questions     Cristina Aiken PA-C  [facility]    Interval History   Patient seen resting in bed at the time of visit, appears comfortable. Denies any shortness of breath, palpitations, chest pain/discomfort, abdominal bloating/fullness, or lower extremity edema.     Physical Exam   Temp: 97.4  F (36.3  C) Temp src: Axillary BP: 103/66 Pulse: 61   Resp: 18 SpO2: 96 % O2 Device: Nasal cannula Oxygen Delivery: 3 LPM  Vitals:    05/02/25 2231 05/03/25 0314 05/05/25 0618   Weight: 71.9 kg (158 lb 8.2 oz) 72.6 kg (160 lb) 71.8 kg (158 lb 4.8 oz)       GEN: well nourished, in no acute distress.  HEENT:  Pupils equal, round. Sclerae nonicteric.   NECK: Supple, no masses appreciated. No JVD with patient.  C/V:  irregularly irregular rhythm, normal rate, no murmur, rub or gallop.   RESP: Respirations are unlabored. Clear to auscultation bilaterally without wheezing, rales, or rhonchi.  GI: Abdomen soft, nontender.  EXTREM: trace bilateral LE edema.  NEURO: Alert and oriented, cooperative.  SKIN: Warm and dry.      Medications   Current Facility-Administered Medications   Medication Dose Route Frequency Provider Last Rate Last Admin    Continuing ACE inhibitor/ARB/ARNI from home medication list OR ACE inhibitor/ARB/ARNI order already placed during this visit   Does not apply DOES NOT GO TO Bentley Timmons DO        Continuing beta blocker from home medication list OR beta blocker order already placed during this visit   Does not apply DOES NOT GO TO Bentley Timmons DO         Current Facility-Administered Medications   Medication Dose Route Frequency Provider Last Rate Last Admin    apixaban ANTICOAGULANT (ELIQUIS) tablet 2.5 mg  2.5 mg Oral BID Wang Moeller MD   2.5 mg at 05/05/25 2028    aspirin EC tablet 81 mg  81 mg Oral QPM Marilyn Montez MD   81 mg at 05/05/25 2028    atorvastatin (LIPITOR) tablet 40 mg  40 mg Oral QPM Marilyn Montez MD   40 mg at 05/05/25 2028    furosemide (LASIX) tablet 40 mg  40 mg Oral Daily Cristina Aiken PA-C   40 mg at 05/05/25 1458    miconazole (MICATIN) 2 % powder   Topical BID Marilyn Montez MD   Given at 05/05/25 2031    midodrine (PROAMATINE) tablet 10 mg  10 mg Oral TID w/meals Carl, Bentley A, DO   10 mg at 05/05/25 1813    pantoprazole (PROTONIX) EC tablet 40 mg  40 mg Oral Daily Carl, Bentley A, DO   40 mg at 05/05/25 0956    spironolactone (ALDACTONE) half-tab 12.5 mg  12.5 mg Oral Daily CarlDaijant A, DO   12.5 mg at 05/05/25 0957       Data   Last 24 hours labs reviewed     Tele:  underlying Afib 60s; 9b VT     Echo: 5/3/25  The visual ejection fraction is 15-20%.  There is severe global hypokinesia of the left ventricle.  The left ventricle is severely dilated.  There is a catheter/pacemaker lead seen in the right ventricle.  There is moderate (2+) mitral regurgitation.  There is mild (1+) tricuspid regurgitation.  There is mild (1+) aortic regurgitation.  There is no pericardial effusion.  Moderate left pleural effusion      Outside echo dated 1/20/25 with EF < 20%    Prior Echo 1/20/25  Severe left ventricular enlargement.   Severe left ventricular systolic dysfunction, ejection fraction estimated at   20%.   Elevated LV filling pressures.   Sclerotic aortic valve without significant stenosis.   Mild aortic valve regurgitation.   Mildly dilated left atrium.     Prior PET Cardiac Viability 12/21/18  Nuclear conclusions:     There is mild uptake of the lateral and distal anterior apical wall   suggesting some minimal viability.  The overall LV is severely dilated   with severe reduction in ejection fraction.     Prior Cardiac Catheterization 12/10/2018  1. Critical LAD/diagonal disease  Recommendations  I was unable to pass a wire into the true LAD. Will need to consider  revascularization with LIMA to the LAD versus high risk PCI and potential  atherectomy at a center that can provide more percutaneous support. The  patient is EF is severely reduced.     LMCA: No obstructive disease    LAD: The LAD has a 99% lesion at the bifurcation of a large diagonal artery  there is a significant atheroma with heavy calcification there was BERTIN 2  flow of the LAD. We attempted to wire the LAD. I was able to wire the  diagonal but we could not pass a wire into the LAD past the lesion    LCx: The left circumflex has no major obstructive disease, it is a dominant  artery    RCA: Small nondominant artery     Device: Dual Chamber [employer] ICD, implanted 6/11/2019

## 2025-05-06 NOTE — PLAN OF CARE
Provider notified of telemetry tech reporting afternoon of ST elevation (strip in chart) and early evening 7 beats of Vtach. Pt asymptomatic. Demand V-Paced otherwise.

## 2025-05-06 NOTE — CONSULTS
[facility]O.[facility]    Received CORE Clinic Consult from KIAN Cruz.    Reviewed Eric's chart and note they are admitted for HFrEF exacerbation. Echocardiogram on 5/3/25 shows LVEF 15-20%.  This is their first admission(s) in the past year for concerns of heart failure.    Given above information, Eric meets criteria for CORE Clinic as patients EF is =/<40%.     Met with Eric and wife Garima at bedside to discuss CORE Clinic consult request.  Patient/family confirm they plan to enroll in CORE Clinic.    Patient's primary insurance:  [employer]    Pt reports the following HF symptoms prior to admission:  shortness of breath, BLE edema    Living situation:  Independent living at [facility].  Recently moved there.  They used to winter in [place] each yr x 14 years, but just sold their trailer and do not plan to winter there anymore.  They will stay in [place] 12 months/year now.  Garima still drives and attends appts with Eric.     Med set up:  Wife Garima.  Uses pill box    Scale available at home:  yes, weighs daily    Barriers to HF follow-up:  None noted.      Remote Monitoring:  consider ECC or CardioMEMS enrollment at upcoming CORE office visit    Medication review: Patient is not on both Entresto and a SGLT2 Inhibitor (Jardiance, Farxiga, Invokana), Pharmacy Liaison Consult placed for coverage review.     CM/HF education topics we reviewed:  Low sodium  Fluid Restriction  Daily weights  Symptoms of HF to be reported to CORE Team.    Education materials provided:    Low sodium food and drink handout  Low sodium food product examples  Already prepared low salt meal delivery services  HF stoplight tool  Guide to HF booklet      I have arranged a lab appointment and CORE visit with KIAN Longo on 5/13/25, see below.     Instructed Eric to call Dr. Wolfe nurse team with questions/concerns prior to this visit.  Specifically instructed them to call RN with weight gain greater than 2  lbs overnight, and/or 5 lbs in a week, and/or worsening SOB/edema/orthopnea.     Future Appointments   Date Time Provider Department Center   5/7/2025 12:00 PM  PT 1 WAITLIST [facility] [place] [facility]   5/13/2025 11:45 AM RU LAB [facility] [facility]   5/13/2025 12:40 PM Carla Baez, XIOMARA [facility] PHIL Reyes and Garima both voice understanding and deny further questions or concerns at this time.      Please call with any further questions.    Shauna Hernández RN BSN   [facility]- [place], [place]  C.O.R.E. Clinic Care Coordinator  [phone]

## 2025-05-06 NOTE — PROGRESS NOTES
Despite pt's oxygen on at 3 liters per nasal cannula, pt does cycle his sats from 96-97% down to 70%. Does not stay down to 70% for more than 3-4 seconds and back up to the 90's. Will continue to monitor.

## 2025-05-06 NOTE — PROGRESS NOTES
[facility]: Heart Failure Care Coordination   Documentation    Situation/Background:      Chief Complaint   Patient presents with    Clinic Care Coordination - Post Hospital     CORE Enrollment        Assessment:      Orders placed for CORE Enrollment w/ labs for post HFrEF ex hospitalization per Dr. Armenta.     Intervention/Plan:      Messaged scheduling requesting they arrange appts        Shauna Hernández, RN BSN   [facility]- [place], [place]  [facility] Clinic Care Coordinator  05/06/25, 12:45 PM    [phone]

## 2025-05-06 NOTE — PROGRESS NOTES
[facility]    Hospitalist Progress Note  Date of admit: 5/2/2025  HD#2; DOS: 5/4/2025     Assessment & Plan   Eric Poole is a 84 year old man who was admitted on 5/2/2025. PMH significant for chronic systolic heart failure w/ LVEF 20%, mild AVR and pacemaker/ICD, pulmonary hypertension w/ severe LETICIA , moderate COPD, PAF on Eliquis, hx secondary erythrocytosis (s/p therapeutic phlebotomies), BPH, chronic hypotension, and overactive bladder.  Patient presented for evaluation due to shortness of breath and report of abnormal lab (BNP markedly elevated).  Patient was establishing with new provider in [place] and pending outpatient cardiology referral as he had previously followed in [place].  On presentation the emergency department, patient was noting increased shortness of breath, exertional dyspnea, and lower extremity edema.  Denied any chest pain.  In ED was noted to have BNP 27K and was given 60 IV lasix x1 with good response. Trop minimally elevated at 26, EKG with afib but rate controlled. Of note, patient was admitted in [place] in March 2025 for heart failure exacerbation.  He had medications adjusted at that time due to hypotension, DIEGO, CHF.     Interval events: Successfully completed voiding trial and West catheter removed. Planning discharge to TCU tomorrow. Family to transport. Son and daughter updated. Outpatient palliative care referral.        Acute on chronic systolic HF, decompensated  Type 2 MI with elevated, stable troponin  Cardiomyopathy w/ EF 15-20% s/p ICD  -presented to establish with local physician and had abnormal lab. Presented to ED today and found to have BNP >27k with LE edema, sob, exertional dyspnea. Trop 26, but no CP. Trend trops and monitor on tele  -given 60 IV lasix x1 in ED and continue Lasix 40 mg IV twice daily. Spironolactone 12.5 mg daily, as well.   - Appreciate cardiology consultation for ongoing recommendations.  Patient prefers to follow with  [facility] here. CORE clinic planning to see patient 5/6.  - GDMT rather limited due to chronic hypotension (on midodrine), intolerance of ACEi/ARB/BB and recurrent UTI on SGLT-2.  On + spironolactone, furosemide, ASA and atorvastatin.   - cardiac diet with 2L fluid restriction.      Atrial fibrillation  Chronic anticoagulation with Eliquis  Type II NSTEMI   -Digoxin discontinued  (CKD likely cardiorenal).  Per discussion with [place] -- if AF with AVR recurs, consider AVNA.  No longer on warfarin and is on apixaban.   -FEN: replete elytes PRN.  K goal >/= 4 and Mg goal >/= 2. Repeat BMP and Mg in the AM.     Pulmonary HTN  Severe LETICIA  Moderate COPD  History of chronic respiratory failure  - Has required O2 in the past. Monitor for requirements here. On no maintenance medications.   - Offer CPAP here.    -Patient did have overnight oximetry of 81% and will need continued O2 at night and with exertion on discharge to TCU. Will need to consider further home O2 thereafter.   - Pulmonary toilet.     Chronic hypotension  - Continue PTA midodrine.    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  -Continue diuresing as above.  Have transitioned to PO diuretics 5/5/25   -Renally dose medications and avoid nephrotoxins. Follow BMP.  -Continue strict I and O and daily weights.   - Consider nephrology referral, likely as outpatient.     BPH  Overactive bladder  History of urinary retention  - Admission med rec deleted Cardura and Proscar from current medications.    - Hold resuming above for now.   - Patient successfully completed trial of void 5/6.  Consider outpatient urology referral depending on ongoing symptoms, noting that patient may not tolerate medications given hypotension.    GERD  -Continue PTA PPI.    Protein Calorie Malnutrition:  BMI: 22  - Appreciate dietician assistance.  Encourage PO intake.  Supplements.      DVT Prophylaxis: DOAC  Code Status: No  CPR- Do NOT Intubate  Medically Ready for Discharge: Anticipated Tomorrow  Expected discharge: Anticipate discharge to TCU tomorrow.    Marilyn Rai MD   Hospitalist Service  [facility]    ------------------------------------------------------------------------------------------------    Interval History   No acute events. TCU recommended by PT and OT. SW has found placement for 5/7 morning. Patient OK with me adding contact information for Ankush Poole (son) and Valeri Poole (daughter) in addition to daughter Alicia, who was already listed. Patient OK with son being first contact as he keeps up with medical appointments. Ankush and Alicia updated today by phone. Referral for outpatient palliative care placed to follow up with patient for ongoing goals discussions in setting of advanced heart failure with immediate plan for TCU.       Physical Exam   Temp: 97.4  F (36.3  C) Temp src: Oral BP: 117/58 Pulse: 61   Resp: 16 SpO2: 94 % O2 Device: None (Room air) Oxygen Delivery: 3 LPM  Vitals:    05/02/25 2231 05/03/25 0314 05/05/25 0618   Weight: 71.9 kg (158 lb 8.2 oz) 72.6 kg (160 lb) 71.8 kg (158 lb 4.8 oz)     Vital Signs with Ranges  Temp:  [97.2  F (36.2  C)-98  F (36.7  C)] 97.4  F (36.3  C)  Pulse:  [57-77] 61  Resp:  [14-18] 16  BP: (103-117)/(58-77) 117/58  SpO2:  [93 %-97 %] 94 %  I/O last 3 completed shifts:  In: 1770 [P.O.:1770]  Out: 1544 [Urine:1544]    Constitutional: Pleasant older gentleman seen sitting up in bed. Patient is alert and oriented x3. No acute distress. Answering questions appropriately at time of exam.   HEENT: NCAT. EOMI. Moist oral mucosa.  Respiratory: Clear to auscultation bilaterally. No crackles or wheezes.  Cardiovascular: Regular rate; paced rhythm. No murmur. Venous stasis changes to lower extremities with 1+ edema bilaterally.  GI: Soft, nontender, nondistended.   Musculoskeletal: No gross deformities.   Neurologic: Alert and oriented x3. No focal  neurologic deficits. Did not assess gait.      Medications   Current Facility-Administered Medications   Medication Dose Route Frequency Provider Last Rate Last Admin    Continuing ACE inhibitor/ARB/ARNI from home medication list OR ACE inhibitor/ARB/ARNI order already placed during this visit   Does not apply DOES NOT GO TO MAR Bentley Henry DO        Continuing beta blocker from home medication list OR beta blocker order already placed during this visit   Does not apply DOES NOT GO TO MAR Bentley Henry DO         Current Facility-Administered Medications   Medication Dose Route Frequency Provider Last Rate Last Admin    apixaban ANTICOAGULANT (ELIQUIS) tablet 2.5 mg  2.5 mg Oral BID Wang Moeller MD   2.5 mg at 05/06/25 0824    aspirin EC tablet 81 mg  81 mg Oral QPM Marilyn Mnotez MD   81 mg at 05/05/25 2028    atorvastatin (LIPITOR) tablet 40 mg  40 mg Oral QPM Marilyn Montez MD   40 mg at 05/05/25 2028    furosemide (LASIX) tablet 40 mg  40 mg Oral Daily Cristina Aiken PA-C   40 mg at 05/06/25 0824    miconazole (MICATIN) 2 % powder   Topical BID Marilyn Montez MD   Given at 05/06/25 0853    midodrine (PROAMATINE) tablet 10 mg  10 mg Oral TID w/meals Carl, Bentley A, DO   10 mg at 05/06/25 1134    pantoprazole (PROTONIX) EC tablet 40 mg  40 mg Oral Daily Carl, Bentley A, DO   40 mg at 05/06/25 0823    spironolactone (ALDACTONE) half-tab 12.5 mg  12.5 mg Oral Daily Carl, Bentley A, DO   12.5 mg at 05/06/25 0824       Data   Recent Labs   Lab 05/06/25  0518 05/05/25  0540 05/04/25  1801 05/04/25  0536 05/03/25  0516 05/02/25  2337 05/02/25  1847 05/02/25  1825   WBC 5.7 5.6  --  5.7 6.5  --   --  7.0   HGB 15.1 15.4  --  15.0 14.8  --   --  16.3   MCV 93 92  --  92 92  --   --  93    194  --  198 206  --   --  228   INR  --   --   --  1.65* 1.67* 1.59*   < >  --     139 138 139 140  --   --  140   POTASSIUM 4.1 4.1 4.1 4.1 4.3  --   --  4.5   CHLORIDE 99 101  101 103 102  --   --  101   CO2 28 29 25 26 28  --   --  28   BUN 33.3* 29.1* 27.3* 28.2* 30.8*  --   --  35.2*   CR 1.08 1.11 1.13 1.13 1.23*  --   --  1.18*   ANIONGAP 8 9 12 10 10  --   --  11   ESTEFANÍA 8.6* 8.4* 8.5* 8.7* 8.7*  --   --  9.0   GLC 85 91 104* 95 92  --   --  100*   ALBUMIN  --   --   --   --   --   --   --  3.8   PROTTOTAL  --   --   --   --   --   --   --  6.5   BILITOTAL  --   --   --   --   --   --   --  0.9   ALKPHOS  --   --   --   --   --   --   --  109   ALT  --   --   --   --   --   --   --  9   AST  --   --   --   --   --   --   --  24    < > = values in this interval not displayed.       No results found for this or any previous visit (from the past 24 hours).

## 2025-05-06 NOTE — PLAN OF CARE
"A&Ox4. VSS; weened to room air. Blake removed w/ trial of void successful. Ax1 w/ walker and GB. Tolerating regular diet. F.R of 2000 ml and strict I&O. Tele: demand V-paced w/ afternoon report tele-tech report of ST elevation early afternoon (resolved) and 7 beats of V-tach early evening; pt asymptomatic. Anticipating [facility] w/ family discharging around noon. Call light in reach; pt able to make needs known. Bed alarm on for safety.    /58 (BP Location: Right arm)   Pulse 61   Temp 97.4  F (36.3  C) (Oral)   Resp 16   Ht 1.803 m (5' 11\")   Wt 71.8 kg (158 lb 4.8 oz)   SpO2 94%   BMI 22.08 kg/m        Goal Outcome Evaluation:      Plan of Care Reviewed With: patient    Overall Patient Progress: improvingOverall Patient Progress: improving    Outcome Evaluation: A&Ox4. VSS; weened to room air. Blake removed w/ trial of void completed; no need to blake at this time. Ax1 w/ walker and GB. Tolerating regular diet. F.R of 2000 ml and strict I&O. Anticipating discharge to [facility] tomorrow.      Problem: Adult Inpatient Plan of Care  Goal: Plan of Care Review  Description: The Plan of Care Review/Shift note should be completed every shift.  The Outcome Evaluation is a brief statement about your assessment that the patient is improving, declining, or no change.  This information will be displayed automatically on your shiftnote.  5/6/2025 1840 by Christine Hale, RN  Outcome: Progressing  Flowsheets (Taken 5/6/2025 1839)  Outcome Evaluation:   A&Ox4. VSS   weened to room air. Blake removed w/ trial of void completed   no need to blake at this time. Ax1 w/ walker and GB. Tolerating regular diet. F.R of 2000 ml and strict I&O. Anticipating discharge [facility] tomorrow.  Plan of Care Reviewed With: patient  Overall Patient Progress: improving  5/6/2025 1826 by Christine Hale, RN  Outcome: Progressing  Flowsheets (Taken 5/6/2025 1732)  Plan of Care Reviewed With: patient  Overall Patient " "Progress: improving  Goal: Patient-Specific Goal (Individualized)  Description: You can add care plan individualizations to a care plan. Examples of Individualization might be:  \"Parent requests to be called daily at 9am for status\", \"I have a hard time hearing out of my right ear\", or \"Do not touch me to wake me up as it startlesme\".  5/6/2025 1840 by Christine Hale RN  Outcome: Progressing  5/6/2025 1826 by Christine Hale RN  Outcome: Progressing  Goal: Absence of Hospital-Acquired Illness or Injury  5/6/2025 1840 by Christine Hale RN  Outcome: Progressing  5/6/2025 1826 by Christine Hale RN  Outcome: Progressing  Intervention: Identify and Manage Fall Risk  Recent Flowsheet Documentation  Taken 5/6/2025 0815 by Christine Hale RN  Safety Promotion/Fall Prevention:   activity supervised   clutter free environment maintained   lighting adjusted   nonskid shoes/slippers when out of bed   safety round/check completed  Intervention: Prevent Skin Injury  Recent Flowsheet Documentation  Taken 5/6/2025 0815 by Christine Hale RN  Body Position: position changed independently  Skin Protection:   adhesive use limited   incontinence pads utilized  Intervention: Prevent and Manage VTE (Venous Thromboembolism) Risk  Recent Flowsheet Documentation  Taken 5/6/2025 0815 by Christine Hale RN  VTE Prevention/Management: SCDs off (sequential compression devices)  Intervention: Prevent Infection  Recent Flowsheet Documentation  Taken 5/6/2025 0815 by Christine Hale RN  Infection Prevention:   hand hygiene promoted   rest/sleep promoted   cohorting utilized  Goal: Optimal Comfort and Wellbeing  5/6/2025 1840 by Christine Hale RN  Outcome: Progressing  5/6/2025 1826 by Christine Hale RN  Outcome: Progressing  Intervention: Provide Person-Centered Care  Recent Flowsheet Documentation  Taken 5/6/2025 0815 by Christine Hale RN  Trust Relationship/Rapport:   care explained   choices " provided  Goal: Readiness for Transition of Care  5/6/2025 1840 by Christine Hale RN  Outcome: Progressing  5/6/2025 1826 by Christine Hale RN  Outcome: Progressing     Problem: Delirium  Goal: Optimal Coping  5/6/2025 1840 by Chirstine Hale RN  Outcome: Progressing  5/6/2025 1826 by Christine Hale RN  Outcome: Progressing  Goal: Improved Behavioral Control  5/6/2025 1840 by Christine Hale RN  Outcome: Progressing  5/6/2025 1826 by Christine Hale RN  Outcome: Progressing  Intervention: Minimize Safety Risk  Recent Flowsheet Documentation  Taken 5/6/2025 0815 by Christine Hale RN  Enhanced Safety Measures: patient/family teach back on injury risk  Trust Relationship/Rapport:   care explained   choices provided  Goal: Improved Attention and Thought Clarity  5/6/2025 1840 by Christine Hale RN  Outcome: Progressing  5/6/2025 1826 by Christine Hale RN  Outcome: Progressing  Goal: Improved Sleep  5/6/2025 1840 by Christine Hale RN  Outcome: Progressing  5/6/2025 1826 by Christine Hale RN  Outcome: Progressing     Problem: Fluid Volume Excess  Goal: Fluid Balance  5/6/2025 1840 by Christine Hale RN  Outcome: Progressing  5/6/2025 1826 by Christine Hale RN  Outcome: Progressing  Intervention: Monitor and Manage Hypervolemia  Recent Flowsheet Documentation  Taken 5/6/2025 0815 by Christine Hale, RN  Skin Protection:   adhesive use limited   incontinence pads utilized

## 2025-05-06 NOTE — PLAN OF CARE
"  Problem: Adult Inpatient Plan of Care  Goal: Plan of Care Review  Description: The Plan of Care Review/Shift note should be completed every shift.  The Outcome Evaluation is a brief statement about your assessment that the patient is improving, declining, or no change.  This information will be displayed automatically on your shiftnote.  Outcome: Progressing  Flowsheets (Taken 5/6/2025 0503)  Outcome Evaluation: see note re: oxygen. pt may need to discharge with oxygen, has in the past. intermittant confusion noted, not new. good urine output via blake, may do a voiding trial today. More confused this am, not as easily redirected. Sats more consistently in the 90's   Plan of Care Reviewed With: patient  Overall Patient Progress: improving  Goal: Patient-Specific Goal (Individualized)  Description: You can add care plan individualizations to a care plan. Examples of Individualization might be:  \"Parent requests to be called daily at 9am for status\", \"I have a hard time hearing out of my right ear\", or \"Do not touch me to wake me up as it startlesme\".  Outcome: Progressing  Goal: Absence of Hospital-Acquired Illness or Injury  Outcome: Progressing  Intervention: Identify and Manage Fall Risk  Recent Flowsheet Documentation  Taken 5/6/2025 0009 by Trudy Remy RN  Safety Promotion/Fall Prevention:   activity supervised   clutter free environment maintained   lighting adjusted   nonskid shoes/slippers when out of bed   safety round/check completed  Intervention: Prevent Skin Injury  Recent Flowsheet Documentation  Taken 5/6/2025 0009 by Trudy Remy, RN  Body Position: position changed independently  Skin Protection: adhesive use limited  Goal: Optimal Comfort and Wellbeing  Outcome: Progressing  Intervention: Provide Person-Centered Care  Recent Flowsheet Documentation  Taken 5/6/2025 0009 by Trudy Remy RN  Trust Relationship/Rapport:   care explained   choices provided  Goal: Readiness for " Transition of Care  Outcome: Progressing     Problem: Delirium  Goal: Optimal Coping  Outcome: Progressing  Goal: Improved Behavioral Control  Outcome: Progressing  Intervention: Prevent and Manage Agitation  Recent Flowsheet Documentation  Taken 5/6/2025 0009 by Trudy Remy RN  Environment Familiarity/Consistency: daily routine followed  Intervention: Minimize Safety Risk  Recent Flowsheet Documentation  Taken 5/6/2025 0009 by Trudy Remy RN  Enhanced Safety Measures: patient/family teach back on injury risk  Trust Relationship/Rapport:   care explained   choices provided  Goal: Improved Attention and Thought Clarity  Outcome: Progressing  Intervention: Maximize Cognitive Function  Recent Flowsheet Documentation  Taken 5/6/2025 0009 by Trudy Remy RN  Sensory Stimulation Regulation:   care clustered   lighting decreased  Reorientation Measures:   clock in view   reorientation provided  Goal: Improved Sleep  Outcome: Progressing  Intervention: Promote Sleep  Recent Flowsheet Documentation  Taken 5/6/2025 0009 by Trudy Remy RN  Sleep/Rest Enhancement:   comfort measures   awakenings minimized     Problem: Fluid Volume Excess  Goal: Fluid Balance  Outcome: Progressing  Intervention: Monitor and Manage Hypervolemia  Recent Flowsheet Documentation  Taken 5/6/2025 0009 by Trudy Remy RN  Skin Protection: adhesive use limited  Fluid/Electrolyte Management: fluids provided   Goal Outcome Evaluation:      Plan of Care Reviewed With: patient    Overall Patient Progress: improvingOverall Patient Progress: improving    Outcome Evaluation: see note re: oxygen. pt may need to discharge with oxygen, has in the past. intermittant confusion noted, not new. good urine output via blake, may do a voiding trial today.

## 2025-05-07 VITALS
TEMPERATURE: 97.3 F | WEIGHT: 159.3 LBS | SYSTOLIC BLOOD PRESSURE: 104 MMHG | BODY MASS INDEX: 22.3 KG/M2 | HEIGHT: 71 IN | DIASTOLIC BLOOD PRESSURE: 68 MMHG | RESPIRATION RATE: 16 BRPM | HEART RATE: 60 BPM | OXYGEN SATURATION: 93 %

## 2025-05-07 LAB
ANION GAP SERPL CALCULATED.3IONS-SCNC: 11 MMOL/L (ref 7–15)
BASOPHILS # BLD AUTO: 0.1 10E3/UL (ref 0–0.2)
BASOPHILS NFR BLD AUTO: 1 %
BUN SERPL-MCNC: 30.3 MG/DL (ref 8–23)
CALCIUM SERPL-MCNC: 8.7 MG/DL (ref 8.8–10.4)
CHLORIDE SERPL-SCNC: 104 MMOL/L (ref 98–107)
CREAT SERPL-MCNC: 0.99 MG/DL (ref 0.67–1.17)
EGFRCR SERPLBLD CKD-EPI 2021: 75 ML/MIN/1.73M2
EOSINOPHIL # BLD AUTO: 0.1 10E3/UL (ref 0–0.7)
EOSINOPHIL NFR BLD AUTO: 2 %
ERYTHROCYTE [DISTWIDTH] IN BLOOD BY AUTOMATED COUNT: 16.5 % (ref 10–15)
GLUCOSE SERPL-MCNC: 95 MG/DL (ref 70–99)
HCO3 SERPL-SCNC: 23 MMOL/L (ref 22–29)
HCT VFR BLD AUTO: 47.8 % (ref 40–53)
HGB BLD-MCNC: 15.7 G/DL (ref 13.3–17.7)
IMM GRANULOCYTES # BLD: 0 10E3/UL
IMM GRANULOCYTES NFR BLD: 0 %
LYMPHOCYTES # BLD AUTO: 0.8 10E3/UL (ref 0.8–5.3)
LYMPHOCYTES NFR BLD AUTO: 15 %
MAGNESIUM SERPL-MCNC: 2 MG/DL (ref 1.7–2.3)
MCH RBC QN AUTO: 30.2 PG (ref 26.5–33)
MCHC RBC AUTO-ENTMCNC: 32.8 G/DL (ref 31.5–36.5)
MCV RBC AUTO: 92 FL (ref 78–100)
MONOCYTES # BLD AUTO: 0.5 10E3/UL (ref 0–1.3)
MONOCYTES NFR BLD AUTO: 10 %
NEUTROPHILS # BLD AUTO: 3.9 10E3/UL (ref 1.6–8.3)
NEUTROPHILS NFR BLD AUTO: 72 %
NRBC # BLD AUTO: 0 10E3/UL
NRBC BLD AUTO-RTO: 0 /100
PHOSPHATE SERPL-MCNC: 3 MG/DL (ref 2.5–4.5)
PLATELET # BLD AUTO: 188 10E3/UL (ref 150–450)
POTASSIUM SERPL-SCNC: 4.4 MMOL/L (ref 3.4–5.3)
RBC # BLD AUTO: 5.2 10E6/UL (ref 4.4–5.9)
SODIUM SERPL-SCNC: 138 MMOL/L (ref 135–145)
WBC # BLD AUTO: 5.4 10E3/UL (ref 4–11)

## 2025-05-07 PROCEDURE — 99239 HOSP IP/OBS DSCHRG MGMT >30: CPT | Performed by: INTERNAL MEDICINE

## 2025-05-07 PROCEDURE — 250N000013 HC RX MED GY IP 250 OP 250 PS 637: Performed by: HOSPITALIST

## 2025-05-07 PROCEDURE — 36415 COLL VENOUS BLD VENIPUNCTURE: CPT | Performed by: INTERNAL MEDICINE

## 2025-05-07 PROCEDURE — 84100 ASSAY OF PHOSPHORUS: CPT | Performed by: INTERNAL MEDICINE

## 2025-05-07 PROCEDURE — 85025 COMPLETE CBC W/AUTO DIFF WBC: CPT | Performed by: INTERNAL MEDICINE

## 2025-05-07 PROCEDURE — 83735 ASSAY OF MAGNESIUM: CPT | Performed by: INTERNAL MEDICINE

## 2025-05-07 PROCEDURE — 82435 ASSAY OF BLOOD CHLORIDE: CPT | Performed by: INTERNAL MEDICINE

## 2025-05-07 PROCEDURE — 250N000013 HC RX MED GY IP 250 OP 250 PS 637: Performed by: INTERNAL MEDICINE

## 2025-05-07 PROCEDURE — 250N000013 HC RX MED GY IP 250 OP 250 PS 637: Performed by: STUDENT IN AN ORGANIZED HEALTH CARE EDUCATION/TRAINING PROGRAM

## 2025-05-07 RX ORDER — FUROSEMIDE 40 MG/1
40 TABLET ORAL DAILY
DISCHARGE
Start: 2025-05-08

## 2025-05-07 RX ORDER — FUROSEMIDE 20 MG/1
20 TABLET ORAL DAILY PRN
DISCHARGE
Start: 2025-05-07

## 2025-05-07 RX ORDER — MIDODRINE HYDROCHLORIDE 10 MG/1
10 TABLET ORAL
DISCHARGE
Start: 2025-05-07

## 2025-05-07 RX ADMIN — MIDODRINE HYDROCHLORIDE 10 MG: 10 TABLET ORAL at 11:06

## 2025-05-07 RX ADMIN — MIDODRINE HYDROCHLORIDE 10 MG: 10 TABLET ORAL at 07:26

## 2025-05-07 RX ADMIN — PANTOPRAZOLE SODIUM 40 MG: 40 TABLET, DELAYED RELEASE ORAL at 07:26

## 2025-05-07 RX ADMIN — Medication 12.5 MG: at 07:26

## 2025-05-07 RX ADMIN — FUROSEMIDE 40 MG: 40 TABLET ORAL at 07:26

## 2025-05-07 RX ADMIN — APIXABAN 2.5 MG: 2.5 TABLET, FILM COATED ORAL at 07:26

## 2025-05-07 ASSESSMENT — ACTIVITIES OF DAILY LIVING (ADL)
ADLS_ACUITY_SCORE: 50
ADLS_ACUITY_SCORE: 47
ADLS_ACUITY_SCORE: 47
ADLS_ACUITY_SCORE: 50
ADLS_ACUITY_SCORE: 48
ADLS_ACUITY_SCORE: 50
ADLS_ACUITY_SCORE: 50
ADLS_ACUITY_SCORE: 47
ADLS_ACUITY_SCORE: 50

## 2025-05-07 NOTE — PLAN OF CARE
"A&Ox3-4. VSS; on room air. Denies all pain, N/V/D. Daily weight, strict I&O, F.R of 2,000 ml. Voiding adequately. Tele: demand V-paced. Ax1 w/ walker and GB. Medically ready to discharge to [facility] w/ family to transport.    /68 (BP Location: Right arm)   Pulse 60   Temp 97.3  F (36.3  C) (Oral)   Resp 16   Ht 1.803 m (5' 11\")   Wt 72.3 kg (159 lb 4.8 oz)   SpO2 93%   BMI 22.22 kg/m        Goal Outcome Evaluation:      Plan of Care Reviewed With: patient    Overall Patient Progress: improvingOverall Patient Progress: improving    Outcome Evaluation: A&O3-4; intermittent confusion and forgetfulness. TeleL Demand V paced. Medically ready to discharge to TCU.      Problem: Adult Inpatient Plan of Care  Goal: Plan of Care Review  Description: The Plan of Care Review/Shift note should be completed every shift.  The Outcome Evaluation is a brief statement about your assessment that the patient is improving, declining, or no change.  This information will be displayed automatically on your shiftnote.  Outcome: Adequate for Care Transition  Flowsheets (Taken 5/7/2025 1216)  Outcome Evaluation:   A&O3-4   intermittent confusion and forgetfulness. TeleL Demand V paced. Medically ready to discharge to TCU.  Plan of Care Reviewed With: patient  Overall Patient Progress: improving  Goal: Patient-Specific Goal (Individualized)  Description: You can add care plan individualizations to a care plan. Examples of Individualization might be:  \"Parent requests to be called daily at 9am for status\", \"I have a hard time hearing out of my right ear\", or \"Do not touch me to wake me up as it startlesme\".  Outcome: Adequate for Care Transition  Goal: Absence of Hospital-Acquired Illness or Injury  Outcome: Adequate for Care Transition  Intervention: Identify and Manage Fall Risk  Recent Flowsheet Documentation  Taken 5/7/2025 0906 by Christine Hale, RN  Safety Promotion/Fall Prevention:   activity supervised   clutter free " environment maintained   lighting adjusted   nonskid shoes/slippers when out of bed   safety round/check completed  Intervention: Prevent Skin Injury  Recent Flowsheet Documentation  Taken 5/7/2025 0906 by Christine Hale RN  Body Position: position changed independently  Skin Protection:   adhesive use limited   incontinence pads utilized  Intervention: Prevent and Manage VTE (Venous Thromboembolism) Risk  Recent Flowsheet Documentation  Taken 5/7/2025 0906 by Christine Hale RN  VTE Prevention/Management: SCDs off (sequential compression devices)  Intervention: Prevent Infection  Recent Flowsheet Documentation  Taken 5/7/2025 0906 by Christine Hale RN  Infection Prevention:   hand hygiene promoted   rest/sleep promoted   cohorting utilized  Goal: Optimal Comfort and Wellbeing  Outcome: Adequate for Care Transition  Intervention: Provide Person-Centered Care  Recent Flowsheet Documentation  Taken 5/7/2025 0906 by Christine Hale RN  Trust Relationship/Rapport:   care explained   choices provided  Goal: Readiness for Transition of Care  Outcome: Adequate for Care Transition     Problem: Delirium  Goal: Optimal Coping  Outcome: Adequate for Care Transition  Goal: Improved Behavioral Control  Outcome: Adequate for Care Transition  Intervention: Minimize Safety Risk  Recent Flowsheet Documentation  Taken 5/7/2025 0906 by Christine Hale RN  Enhanced Safety Measures: patient/family teach back on injury risk  Trust Relationship/Rapport:   care explained   choices provided  Goal: Improved Attention and Thought Clarity  Outcome: Adequate for Care Transition  Goal: Improved Sleep  Outcome: Adequate for Care Transition     Problem: Fluid Volume Excess  Goal: Fluid Balance  Outcome: Adequate for Care Transition  Intervention: Monitor and Manage Hypervolemia  Recent Flowsheet Documentation  Taken 5/7/2025 0906 by Christine Hale RN  Skin Protection:   adhesive use limited   incontinence pads utilized      Problem: Heart Failure  Goal: Optimal Coping  Outcome: Adequate for Care Transition  Goal: Optimal Cardiac Output  Outcome: Adequate for Care Transition  Goal: Stable Heart Rate and Rhythm  Outcome: Adequate for Care Transition  Goal: Fluid and Electrolyte Balance  Outcome: Adequate for Care Transition  Goal: Optimal Functional Ability  Outcome: Adequate for Care Transition  Intervention: Optimize Functional Ability  Recent Flowsheet Documentation  Taken 5/7/2025 0906 by Christine Hale RN  Activity Management: activity adjusted per tolerance  Goal: Improved Oral Intake  Outcome: Adequate for Care Transition  Goal: Effective Oxygenation and Ventilation  Outcome: Adequate for Care Transition  Intervention: Promote Airway Secretion Clearance  Recent Flowsheet Documentation  Taken 5/7/2025 0906 by Christine Hale RN  Cough And Deep Breathing: done with encouragement  Activity Management: activity adjusted per tolerance  Intervention: Optimize Oxygenation and Ventilation  Recent Flowsheet Documentation  Taken 5/7/2025 0906 by Christine Hale RN  Head of Bed (HOB) Positioning: HOB at 20-30 degrees  Goal: Effective Breathing Pattern During Sleep  Outcome: Adequate for Care Transition  Intervention: Monitor and Manage Obstructive Sleep Apnea  Recent Flowsheet Documentation  Taken 5/7/2025 0906 by Christine Hale RN  Medication Review/Management: medications reviewed     Problem: Comorbidity Management  Goal: Maintenance of COPD Symptom Control  Outcome: Adequate for Care Transition  Intervention: Maintain COPD Symptom Control  Recent Flowsheet Documentation  Taken 5/7/2025 0906 by Christine Hale RN  Medication Review/Management: medications reviewed     Problem: Dysrhythmia  Goal: Normalized Cardiac Rhythm  Outcome: Adequate for Care Transition  Intervention: Monitor and Manage Cardiac Rhythm Effect  Recent Flowsheet Documentation  Taken 5/7/2025 0906 by Christine Hale RN  VTE Prevention/Management:  SCDs off (sequential compression devices)

## 2025-05-07 NOTE — DISCHARGE SUMMARY
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 PMH significant for chronic systolic heart failure w/ LVEF 20%, mild AVR and pacemaker/ICD, pulmonary hypertension w/ severe LETICIA , moderate COPD, PAF on Eliquis, hx secondary erythrocytosis (s/p therapeutic phlebotomies), BPH, chronic hypotension, and overactive bladder.  Patient presented for evaluation due to shortness of breath and report of abnormal lab (BNP markedly elevated).  Patient was establishing with new provider in [place] and pending outpatient cardiology referral as he had previously followed in [place].  On presentation the emergency department, patient was noting increased shortness of breath, exertional dyspnea, and lower extremity edema.  Denied any chest pain.  In ED was noted to have BNP 27K and was given 60 IV lasix x1 with good response. Trop minimally elevated at 26, EKG with afib but rate controlled. Of note, patient was admitted in [place] in March 2025 for heart failure exacerbation.  He had medications adjusted at that time due to hypotension, DIEGO, CHF.    Hospital Course   Eric Poole was admitted on 5/2/2025.  The following problems were addressed during his hospitalization:     Acute on chronic  systolic HF, decompensated  Type 2 MI with elevated, stable troponin  Cardiomyopathy w/ EF 15-20% s/p ICD  -presented to establish with local physician and had abnormal lab. Presented to ED today and found to have BNP >27k with LE edema, sob, exertional dyspnea. Trop 26, but no CP. Trend trops and monitor on tele  -given 60 IV lasix x1 in ED and continue Lasix 40 mg IV twice daily. Spironolactone 12.5 mg daily, as well.   - Appreciate cardiology consultation for ongoing recommendations.  Patient prefers to follow with /[facility] here. [facility] has seen patient in the hospital and outpatient appointment scheduled for 5/13.  - GDMT rather limited due to chronic hypotension (on midodrine), intolerance of ACEi/ARB/BB and recurrent UTI on SGLT-2.  On + spironolactone, furosemide, ASA and atorvastatin.   - 2g sodium diet with 2L fluid restriction.      Atrial fibrillation  Chronic anticoagulation with Eliquis  Type II NSTEMI   -Digoxin discontinued by cardiology (secondary to CKD, likely cardiorenal).  Per previous provider's discussion with cardiology, if atrial fibrillation with RVR recurs, consider ablation.   - Continue Eliquis.    Pulmonary HTN  Severe LETICIA  Moderate COPD  History of chronic respiratory failure  - Has required O2 in the past. Monitor for requirements here. On no maintenance medications.   - Offer CPAP here. Patient has declined here, but states that he would consider at TCU. However, he no longer has machine that works and would likely need new sleep evaluation if he wishes to pursue this. Patient and wife plan to bring this up with PCP.  -Patient did have overnight oximetry of 81%. If he does not wear CPAP at night, would offer 2L O2 at night. Would also offer with activity and continue to assess needs.  - Pulmonary toilet.      Chronic hypotension  - Continue PTA midodrine.     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  -Continue  diuresing as above.  Have transitioned to PO diuretics 5/5/25   -Renally dose medications and avoid nephrotoxins. Follow BMP. Creatinine 0.99 on day of 5/7 discharge.   -Continue strict I and O and daily weights.   - Consider nephrology referral as outpatient if ongoing concern.      BPH  Overactive bladder  History of urinary retention  - Admission med rec deleted Cardura and Proscar from current medications.    - On no medications at this time.    - Patient successfully completed trial of void 5/6.  Consider outpatient urology referral depending on ongoing symptoms, noting that patient may not tolerate medications given hypotension.     GERD  -Continue PTA PPI.     Moderate malnutrition in context of critical illness  - Appreciate dietician assistance.  Encourage PO intake.  Supplements can be considered with meals.       Marilyn Rai MD FACP  Hospitalist Service  [facility]      Significant Results and Procedures   Imaging, echo, and labs below    Pending Results   N/A    Code Status   DNR / DNI       Primary Care Physician   [facility]    Physical Exam   Temp: 97.6  F (36.4  C) Temp src: Oral BP: 119/77 Pulse: 84   Resp: 16 SpO2: 94 % O2 Device: None (Room air)    Vitals:    05/03/25 0314 05/05/25 0618 05/07/25 0605   Weight: 72.6 kg (160 lb) 71.8 kg (158 lb 4.8 oz) 72.3 kg (159 lb 4.8 oz)     Vital Signs with Ranges  Temp:  [97.2  F (36.2  C)-97.6  F (36.4  C)] 97.6  F (36.4  C)  Pulse:  [60-84] 84  Resp:  [14-16] 16  BP: (107-119)/(58-77) 119/77  SpO2:  [93 %-97 %] 94 %  I/O last 3 completed shifts:  In: 1470 [P.O.:1470]  Out: 727 [Urine:727]    Constitutional: Pleasant older gentleman seen sitting up in bed. Wife at bedside. Patient is alert and oriented x3. No acute distress. Answering questions appropriately.  HEENT: NCAT. EOMI. Moist oral mucosa.  Respiratory: Clear to auscultation bilaterally. No crackles or wheezes.  Cardiovascular: Regular rate; paced rhythm. No  murmur. Venous stasis changes to lower extremities with 1+ edema bilaterally.  GI: Soft, nontender, nondistended.   Musculoskeletal: No gross deformities.   Neurologic: Alert and oriented x3. No focal neurologic deficits. Did not assess gait.    Discharge Disposition   Discharged to rehabilitation facility  Condition at discharge: Stable    Consultations This Hospital Stay   OCCUPATIONAL THERAPY ADULT IP CONSULT  [facility] EVALUATION IP CONSULT  PHARMACY TO DOSE WARFARIN  CARDIOLOGY IP CONSULT  CARE MANAGEMENT / SOCIAL WORK IP CONSULT  PHYSICAL THERAPY ADULT IP CONSULT  PHARMACY LIAISON FOR MEDICATION COVERAGE CONSULT  PHYSICAL THERAPY ADULT IP CONSULT  OCCUPATIONAL THERAPY ADULT IP CONSULT    Time Spent on this Encounter   I, Marilyn Rai MD, personally saw the patient today and spent greater than 30 minutes discharging this patient.    Discharge Orders      Adult Palliative Care  Referral      General info for SNF    Length of Stay Estimate: Short Term Care: Estimated # of Days <30  Condition at Discharge: Improving  Level of care:skilled   Rehabilitation Potential: Fair  Admission H&P remains valid and up-to-date: Yes  Recent Chemotherapy: N/A  Use Nursing Home Standing Orders: Yes     Mantoux instructions    Give two-step Mantoux (PPD) Per Facility Policy Yes     Follow Up and recommended labs and tests    Follow up with custodial physician.  The following labs/tests are recommended: CBC and BMP. Recommend follow up with primary care within 1 week of discharge from rehab facility. Recommend cognitive screening when at baseline after hospital stay. Recommend follow up with [facility] clinic and cardiology as scheduled in [place] on 5/13/25 at 12:40 pm. Outpatient referral for palliative care placed to follow for ongoing goals of care and symptoms.     Daily weights    Call Provider for weight gain of more than 2 pounds per day or 5 pounds per week.     Activity - Up with nursing assistance      Reason for your hospital stay    You were hospitalized for evaluation of shortness of breath and elevated labs in setting of heart failure exacerbation.  You were admitted and diuresed with IV medications.  Your medications have been adjusted and you will need to continue having daily weights at facility.  Additional Lasix can be given if weight is increased.  Oxygen is indicated at night and with activity.  Facility should continue to follow oxygen requirements and adjust as needed.  Follow outpatient with cardiology and primary care.  Outpatient referral has also been placed for palliative care to help follow for big picture goals and assist with symptoms moving forward with repeated hospitalizations in past months.     CPAP - Continue Home CPAP    Continue Home CPAP per home equipment settings. If patient refuses, offer 2L oxygen by NC.     No CPR- Do NOT Intubate     Physical Therapy Adult Consult    Evaluate and treat as clinically indicated.    Reason:  deconditioning     Occupational Therapy Adult Consult    Evaluate and treat as clinically indicated.    Reason:  Cognition and ADLs     Oxygen (SNF/TCU) Discharge     Fall precautions     Diet    Follow this diet upon discharge: 2 gram sodium diet. 2000 ml fluid restriction. Offer supplements with meals.     Discharge Medications   Current Discharge Medication List        CONTINUE these medications which have CHANGED    Details   !! furosemide (LASIX) 20 MG tablet Take 1 tablet (20 mg) by mouth daily as needed (wt gain >3 lbs in 1 day or >5 lbs in 1 week).    Associated Diagnoses: Acute on chronic systolic congestive heart failure (H)      !! furosemide (LASIX) 40 MG tablet Take 1 tablet (40 mg) by mouth daily.    Associated Diagnoses: Acute on chronic systolic congestive heart failure (H)      midodrine (PROAMATINE) 10 MG tablet Take 1 tablet (10 mg) by mouth 3 times daily (with meals).    Associated Diagnoses: Acute on chronic systolic congestive heart  failure (H)       !! - Potential duplicate medications found. Please discuss with provider.        CONTINUE these medications which have NOT CHANGED    Details   apixaban ANTICOAGULANT (ELIQUIS) 2.5 MG tablet Take 2.5 mg by mouth 2 times daily.      aspirin 81 MG EC tablet Take 81 mg by mouth every evening.      atorvastatin (LIPITOR) 40 MG tablet Take 40 mg by mouth every evening.      meloxicam (MOBIC) 7.5 MG tablet Take 7.5 mg by mouth daily.      omeprazole (PRILOSEC) 20 MG capsule Take 1 capsule (20 mg) by mouth daily  Qty: 90 capsule, Refills: 3    Associated Diagnoses: Gastroesophageal reflux disease without esophagitis      spironolactone (ALDACTONE) 12.5 mg TABS half-tab Take 12.5 mg by mouth daily.      Vitamin D3 (CHOLECALCIFEROL) 25 mcg (1000 units) tablet Take 25 mcg by mouth every evening.           STOP taking these medications       digoxin (LANOXIN) 125 MCG tablet Comments:   Reason for Stopping:             Allergies   No Active Allergies  Data   Most Recent 3 CBC's:  Recent Labs   Lab Test 05/07/25  0539 05/06/25  0518 05/05/25  0540   WBC 5.4 5.7 5.6   HGB 15.7 15.1 15.4   MCV 92 93 92    177 194      Most Recent 3 BMP's:  Recent Labs   Lab Test 05/07/25  0539 05/06/25  0518 05/05/25  0540    135 139   POTASSIUM 4.4 4.1 4.1   CHLORIDE 104 99 101   CO2 23 28 29   BUN 30.3* 33.3* 29.1*   CR 0.99 1.08 1.11   ANIONGAP 11 8 9   ESTEFANÍA 8.7* 8.6* 8.4*   GLC 95 85 91     Most Recent 2 LFT's:  Recent Labs   Lab Test 05/02/25  1825   AST 24   ALT 9   ALKPHOS 109   BILITOTAL 0.9     Results for orders placed or performed during the hospital encounter of 05/02/25   XR Chest 2 Views    Narrative    EXAM: XR CHEST 2 VIEWS  LOCATION: [facility]  DATE: 5/2/2025    INDICATION: elev bnp  COMPARISON: Frontal and lateral views of the chest 5/1/2025      Impression    IMPRESSION:     Left subclavian approach pacer defibrillator has right atrial appendage and right ventricular leads.  The cardiac silhouette is enlarged, however the lower heart borders are largely silhouetted. Ectatic thoracic aorta is unchanged with crescentic   calcification at the arch. Pulmonary arteries at the onofre are prominent but unchanged.    Small pleural effusions and atelectasis in the bases. There are no findings to suggest interstitial or alveolar edema.    Diffuse, flowing mild to moderate thoracic spine degenerative osteophytes.   Echocardiogram Complete     Value    LVEF  15-20%    [facility]    [id number]  [id number]  [id number]  [id number]^[email or web address]^JENNIFER^ADRIENNE     [facility]  Echocardiography Laboratory  [address] [place] [address]     Name: IJEOMA SPENCER  MRN: [id number]  : 1940  Study Date: 2025 08:17 AM  Age: 84 yrs  Gender: Male  Patient Location: [facility]  Reason For Study: Heart Failure  Ordering Physician: JENNIFER ARREDONDO  Performed By: Christine Guy     BSA: 1.9 m2  Height: 71 in  Weight: 160 lb  HR: 61  BP: 109/68 mmHg  ______________________________________________________________________________  Procedure  Echocardiogram with two-dimensional, color and spectral Doppler. Optison (NDC  #[id number]) given intravenously.  ______________________________________________________________________________  Interpretation Summary     The visual ejection fraction is 15-20%.  There is severe global hypokinesia of the left ventricle.  The left ventricle is severely dilated.  There is a catheter/pacemaker lead seen in the right ventricle.  There is moderate (2+) mitral regurgitation.  There is mild (1+) tricuspid regurgitation.  There is mild (1+) aortic regurgitation.  There is no pericardial effusion.  Moderate left pleural effusion     Outside echo dated 1/20/25 with EF < 20%  ______________________________________________________________________________  Left Ventricle  The left ventricle is severely dilated. There is normal left ventricular wall  thickness. The visual ejection  fraction is 15-20%. There is severe global  hypokinesia of the left ventricle.     Right Ventricle  The right ventricle is normal in structure, function and size. There is a  catheter/pacemaker lead seen in the right ventricle.     Atria  There is mod-severe biatrial enlargement.     Mitral Valve  There is moderate (2+) mitral regurgitation.     Tricuspid Valve  There is mild (1+) tricuspid regurgitation.     Aortic Valve  There is mild (1+) aortic regurgitation.     Pulmonic Valve  The pulmonic valve is not well visualized.     Vessels  Normal size aorta. Mildly dilated proximal aorta. The inferior vena cava is  normal.     Pericardium  There is no pericardial effusion. Moderate left pleural effusion.     ______________________________________________________________________________  MMode/2D Measurements & Calculations  IVSd: 1.0 cm  LVIDd: 8.0 cm  LVIDs: 7.4 cm  LVPWd: 1.1 cm  IVC diam: 2.0 cm  FS: 7.1 %     LV mass(C)d: 418.9 grams  LV mass(C)dI: 218.4 grams/m2  Ao root diam: 4.4 cm  asc Aorta Diam: 4.1 cm  LVOT diam: 2.4 cm  LVOT area: 4.6 cm2  Ao root diam index Ht(cm/m): 2.4  Ao root diam index BSA (cm/m2): 2.3  Asc Ao diam index BSA (cm/m2): 2.1  Asc Ao diam index Ht(cm/m): 2.3  LA Volume (BP): 112.0 ml  LA Volume Index (BP): 58.3 ml/m2     RV Base: 4.5 cm  RWT: 0.26  TAPSE: 2.0 cm     Doppler Measurements & Calculations  MV E max vincenzo: 70.7 cm/sec  MV dec time: 0.14 sec  Ao V2 max: 182.4 cm/sec  Ao max P.0 mmHg  Ao V2 mean: 128.0 cm/sec  Ao mean P.3 mmHg  Ao V2 VTI: 30.5 cm  ALTA(I,D): 1.9 cm2  ALTA(V,D): 1.8 cm2  AI P1/2t: 754.7 msec  LV V1 max P.1 mmHg  LV V1 max: 72.7 cm/sec  LV V1 VTI: 12.4 cm  MR PISA: 1.4 cm2  MR ERO: 0.11 cm2  MR volume: 17.7 ml  SV(LVOT): 57.3 ml  SI(LVOT): 29.8 ml/m2  PA acc time: 0.09 sec  TR max vincenzo: 277.2 cm/sec  TR max P.8 mmHg  AV Vincenzo Ratio (DI): 0.40  ALTA Index (cm2/m2): 0.98     RV S Vincenzo: 11.4 cm/sec      ______________________________________________________________________________  Report approved by: SURAJ Cuevas on 05/03/2025 09:40 AM

## 2025-05-07 NOTE — PLAN OF CARE
Occupational Therapy Discharge Summary    Reason for therapy discharge:    Discharged to transitional care facility.    Progress towards therapy goal(s). See goals on Care Plan in [facility] electronic health record for goal details.  Goals not met.  Barriers to achieving goals:   discharge from facility.    Therapy recommendation(s):    Continued therapy is recommended.  Rationale/Recommendations:  Pt demonstrates decline in endurance and strength. Recommend TCU to progress the above for impoved safety and independence w/ ADLs.    Pt not seen by writer on this date. Note written based on previous treating OT's note and recommendations.

## 2025-05-07 NOTE — PLAN OF CARE
"Physical Therapy Discharge Summary     Reason for therapy discharge:    Discharged to transitional care facility.     Progress towards therapy goal(s). See goals on Care Plan in [facility] electronic health record for goal details.  Goals not met.  Barriers to achieving goals:  discharge to TCU   Therapy recommendation(s):    Continued therapy is recommended.  Rationale/Recommendations:  Per last PT session \"Patient is below baseline functional mobility. Pt reports IND with mobility using 4WW at baseline. CUrrently pt needing up to mod assist to transfer to standing, unable to tolerate ambulation at this time d/t dizziness. SPO2 ~89% with standing on RA, HR ranging 45-75bpm. Pt limited by weakness, deconditioning, dizziness, and impaired balance. Rec TCU to progress strength and IND mobility. \"    *Note based on previous PT notes, not seen by documenting therapist    "

## 2025-05-07 NOTE — PROGRESS NOTES
Care Management Discharge Note    Discharge Date: 05/07/2025       Discharge Disposition:  [facility]     Discharge Services:  none    Discharge DME:  none    Discharge Transportation: family or friend will provide    Private pay costs discussed: private room/amenity fees    Does the patient's insurance plan have a 3 day qualifying hospital stay waiver?  No    PAS Confirmation Code: [id number]  Patient/family educated on Medicare website which has current facility and service quality ratings: yes    Education Provided on the Discharge Plan: Yes  Persons Notified of Discharge Plans: patient, patient's wife, patient's son, care team   Patient/Family in Agreement with the Plan: yes    Handoff Referral Completed: No, handoff not indicated or clinically appropriate    Additional Information:  Patient discharging to TCU via family transport. Transportation costs and options discussed, all parties agreeable. Discharge discussed and confirmed with patient, nursing, hospitalist, patient family member, Fabian, and accepting facility, [facility]. Orders sent via [employer].       KYLE Valdez   Social Work Care Manager   [facility]   [phone]

## 2025-05-07 NOTE — PLAN OF CARE
Assumed care [id number]. A&Ox2, disoriented to place & situation. Ax1 with a gait belt & walker when OOB but can be impulsive and get up on own setting off the bed alarm. On RA. On a 2g Na/fluid restricted diet. Can be incontinent. Had an episode of confusion/agitation overnight- wanted to get dressed and go home. MD came to talk with the patient and patient was redirectable and agreeable to staying overnight. PRN medications ordered if needed. On telemetry- v paced. Plan to discharge back to [facility] with wife at noon today.     Goal Outcome Evaluation:      Plan of Care Reviewed With: patient    Overall Patient Progress: no changeOverall Patient Progress: no change    Outcome Evaluation: A&Ox2- disoriented to place & situation, can be incontinent, on tele, had an episode of confusion/agitation overnight      Problem: Heart Failure  Goal: Optimal Cardiac Output  Outcome: Not Progressing  Goal: Optimal Functional Ability  Outcome: Not Progressing  Intervention: Optimize Functional Ability  Recent Flowsheet Documentation  Taken 5/7/2025 0111 by Ana Rosa Franklin, RN  Activity Management:   ambulated to bathroom   back to bed  Taken 5/6/2025 1921 by Ana Rosa Franklin, RN  Activity Management: activity adjusted per tolerance     Problem: Adult Inpatient Plan of Care  Goal: Plan of Care Review  Description: The Plan of Care Review/Shift note should be completed every shift.  The Outcome Evaluation is a brief statement about your assessment that the patient is improving, declining, or no change.  This information will be displayed automatically on your shiftnote.  Outcome: Progressing  Flowsheets (Taken 5/7/2025 0319)  Outcome Evaluation: A&Ox2- disoriented to place & situation, can be incontinent, on tele, had an episode of confusion/agitation overnight  Plan of Care Reviewed With: patient  Overall Patient Progress: no change  Goal: Patient-Specific Goal (Individualized)  Description: You can add care plan  "individualizations to a care plan. Examples of Individualization might be:  \"Parent requests to be called daily at 9am for status\", \"I have a hard time hearing out of my right ear\", or \"Do not touch me to wake me up as it startlesme\".  Outcome: Progressing  Goal: Absence of Hospital-Acquired Illness or Injury  Outcome: Progressing  Intervention: Identify and Manage Fall Risk  Recent Flowsheet Documentation  Taken 5/6/2025 1921 by Ana Rosa Franklin RN  Safety Promotion/Fall Prevention:   activity supervised   lighting adjusted   nonskid shoes/slippers when out of bed   room near nurse's station   safety round/check completed  Intervention: Prevent Skin Injury  Recent Flowsheet Documentation  Taken 5/6/2025 1921 by Ana Rosa Franklin RN  Body Position: position changed independently  Intervention: Prevent and Manage VTE (Venous Thromboembolism) Risk  Recent Flowsheet Documentation  Taken 5/6/2025 1921 by Ana Rosa Franklin RN  VTE Prevention/Management: (Eliquis) SCDs off (sequential compression devices)  Intervention: Prevent Infection  Recent Flowsheet Documentation  Taken 5/6/2025 1921 by Ana Rosa Franklin RN  Infection Prevention:   equipment surfaces disinfected   hand hygiene promoted   personal protective equipment utilized   rest/sleep promoted   single patient room provided  Goal: Optimal Comfort and Wellbeing  Outcome: Progressing  Goal: Readiness for Transition of Care  Outcome: Progressing     Problem: Delirium  Goal: Optimal Coping  Outcome: Progressing  Goal: Improved Behavioral Control  Outcome: Progressing  Intervention: Minimize Safety Risk  Recent Flowsheet Documentation  Taken 5/6/2025 1921 by Ana Rosa Franklin RN  Enhanced Safety Measures: room near unit station  Goal: Improved Attention and Thought Clarity  Outcome: Progressing  Goal: Improved Sleep  Outcome: Progressing     Problem: Fluid Volume Excess  Goal: Fluid Balance  Outcome: Progressing     Problem: Heart Failure  Goal: Optimal " Coping  Outcome: Progressing  Goal: Stable Heart Rate and Rhythm  Outcome: Progressing  Goal: Fluid and Electrolyte Balance  Outcome: Progressing  Goal: Improved Oral Intake  Outcome: Progressing  Goal: Effective Oxygenation and Ventilation  Outcome: Progressing  Intervention: Promote Airway Secretion Clearance  Recent Flowsheet Documentation  Taken 5/7/2025 0111 by Ana Rosa Franklin RN  Activity Management:   ambulated to bathroom   back to bed  Taken 5/6/2025 1921 by Ana Rosa Franklin RN  Cough And Deep Breathing: done with encouragement  Activity Management: activity adjusted per tolerance  Intervention: Optimize Oxygenation and Ventilation  Recent Flowsheet Documentation  Taken 5/6/2025 1921 by Ana Rosa Franklin RN  Head of Bed (HOB) Positioning: HOB at 30-45 degrees  Goal: Effective Breathing Pattern During Sleep  Outcome: Progressing  Intervention: Monitor and Manage Obstructive Sleep Apnea  Recent Flowsheet Documentation  Taken 5/6/2025 1921 by Ana Rosa Franklin RN  Medication Review/Management: medications reviewed     Problem: Comorbidity Management  Goal: Maintenance of COPD Symptom Control  Outcome: Progressing  Intervention: Maintain COPD Symptom Control  Recent Flowsheet Documentation  Taken 5/6/2025 1921 by Ana Rosa Franklin RN  Medication Review/Management: medications reviewed     Problem: Dysrhythmia  Goal: Normalized Cardiac Rhythm  Outcome: Progressing  Intervention: Monitor and Manage Cardiac Rhythm Effect  Recent Flowsheet Documentation  Taken 5/6/2025 1921 by Ana Rosa Franklin RN  VTE Prevention/Management: (Eliquis) SCDs off (sequential compression devices)

## 2025-05-08 ENCOUNTER — PATIENT OUTREACH (OUTPATIENT)
Dept: CARE COORDINATION | Facility: CLINIC | Age: 85
End: 2025-05-08
Payer: COMMERCIAL

## 2025-05-08 NOTE — PROGRESS NOTES
Connected Care Resource Center: Connected Care Resource [place]    Background: Transitional Care Management program identified per system criteria and reviewed by [facility] team for possible outreach.    Assessment: Upon chart review, CCRC Team member will not proceed with patient outreach related to this episode of Transitional Care Management program due to reason below:    Non-[facility] TCU: CCRC team member noted patient discharged to TCU/ARU/LTACH. Patient is not established with a [facility] currently supported by Primary Care-Care Coordination therefore handoff to Primary Care-Care Coordination is not appropriate at this time.    Plan: Transitional Care Management episode addressed appropriately per reason noted above.      Dominique Graves  Community Health Worker  [facility], [facility]  [phone]     *Connected Care Resource Team does NOT follow patient ongoing. Referrals are identified based on internal discharge reports and the outreach is to ensure patient has an understanding of their discharge instructions.

## 2025-05-09 ENCOUNTER — LAB REQUISITION (OUTPATIENT)
Dept: LAB | Facility: CLINIC | Age: 85
End: 2025-05-09
Payer: COMMERCIAL

## 2025-05-09 DIAGNOSIS — I50.23 ACUTE ON CHRONIC SYSTOLIC (CONGESTIVE) HEART FAILURE (H): ICD-10-CM

## 2025-05-09 DIAGNOSIS — I48.0 PAROXYSMAL ATRIAL FIBRILLATION (H): ICD-10-CM

## 2025-05-12 LAB
ANION GAP SERPL CALCULATED.3IONS-SCNC: 14 MMOL/L (ref 7–15)
BUN SERPL-MCNC: 44.9 MG/DL (ref 8–23)
CALCIUM SERPL-MCNC: 9.3 MG/DL (ref 8.8–10.4)
CHLORIDE SERPL-SCNC: 104 MMOL/L (ref 98–107)
CREAT SERPL-MCNC: 1.17 MG/DL (ref 0.67–1.17)
EGFRCR SERPLBLD CKD-EPI 2021: 61 ML/MIN/1.73M2
ERYTHROCYTE [DISTWIDTH] IN BLOOD BY AUTOMATED COUNT: 16.4 % (ref 10–15)
GLUCOSE SERPL-MCNC: 91 MG/DL (ref 70–99)
HCO3 SERPL-SCNC: 23 MMOL/L (ref 22–29)
HCT VFR BLD AUTO: 47.1 % (ref 40–53)
HGB BLD-MCNC: 14.8 G/DL (ref 13.3–17.7)
MCH RBC QN AUTO: 30.2 PG (ref 26.5–33)
MCHC RBC AUTO-ENTMCNC: 31.4 G/DL (ref 31.5–36.5)
MCV RBC AUTO: 96 FL (ref 78–100)
NT-PROBNP SERPL-MCNC: ABNORMAL PG/ML (ref 0–852)
PLATELET # BLD AUTO: 205 10E3/UL (ref 150–450)
POTASSIUM SERPL-SCNC: 4.2 MMOL/L (ref 3.4–5.3)
RBC # BLD AUTO: 4.9 10E6/UL (ref 4.4–5.9)
SODIUM SERPL-SCNC: 141 MMOL/L (ref 135–145)
TSH SERPL DL<=0.005 MIU/L-ACNC: 2.12 UIU/ML (ref 0.3–4.2)
WBC # BLD AUTO: 7.2 10E3/UL (ref 4–11)

## 2025-05-12 PROCEDURE — 83880 ASSAY OF NATRIURETIC PEPTIDE: CPT | Mod: ORL | Performed by: INTERNAL MEDICINE

## 2025-05-12 PROCEDURE — 84443 ASSAY THYROID STIM HORMONE: CPT | Mod: ORL | Performed by: INTERNAL MEDICINE

## 2025-05-13 ENCOUNTER — PATIENT OUTREACH (OUTPATIENT)
Dept: CARDIOLOGY | Facility: CLINIC | Age: 85
End: 2025-05-13

## 2025-05-13 ENCOUNTER — INFUSION THERAPY VISIT (OUTPATIENT)
Dept: INFUSION THERAPY | Facility: CLINIC | Age: 85
End: 2025-05-13
Attending: PHYSICIAN ASSISTANT
Payer: COMMERCIAL

## 2025-05-13 ENCOUNTER — OFFICE VISIT (OUTPATIENT)
Dept: CARDIOLOGY | Facility: CLINIC | Age: 85
End: 2025-05-13
Payer: COMMERCIAL

## 2025-05-13 ENCOUNTER — LAB REQUISITION (OUTPATIENT)
Dept: LAB | Facility: CLINIC | Age: 85
End: 2025-05-13
Payer: COMMERCIAL

## 2025-05-13 VITALS
WEIGHT: 163 LBS | SYSTOLIC BLOOD PRESSURE: 124 MMHG | DIASTOLIC BLOOD PRESSURE: 76 MMHG | OXYGEN SATURATION: 96 % | HEART RATE: 60 BPM | BODY MASS INDEX: 22.82 KG/M2 | HEIGHT: 71 IN

## 2025-05-13 DIAGNOSIS — I50.23 ACUTE ON CHRONIC SYSTOLIC HEART FAILURE (H): Primary | ICD-10-CM

## 2025-05-13 DIAGNOSIS — I50.23 ACUTE ON CHRONIC SYSTOLIC CONGESTIVE HEART FAILURE (H): ICD-10-CM

## 2025-05-13 DIAGNOSIS — I50.21 ACUTE SYSTOLIC HEART FAILURE (H): ICD-10-CM

## 2025-05-13 DIAGNOSIS — I50.23 ACUTE ON CHRONIC SYSTOLIC (CONGESTIVE) HEART FAILURE (H): ICD-10-CM

## 2025-05-13 PROCEDURE — 250N000011 HC RX IP 250 OP 636: Performed by: PHYSICIAN ASSISTANT

## 2025-05-13 PROCEDURE — 96374 THER/PROPH/DIAG INJ IV PUSH: CPT

## 2025-05-13 RX ORDER — FUROSEMIDE 10 MG/ML
80 INJECTION INTRAMUSCULAR; INTRAVENOUS ONCE
Status: CANCELLED
Start: 2025-05-13 | End: 2025-05-13

## 2025-05-13 RX ORDER — SPIRONOLACTONE 25 MG/1
25 TABLET ORAL DAILY
Qty: 90 TABLET | Refills: 3 | Status: SHIPPED | OUTPATIENT
Start: 2025-05-13

## 2025-05-13 RX ORDER — FUROSEMIDE 40 MG/1
40 TABLET ORAL
Qty: 180 TABLET | Refills: 3 | Status: SHIPPED | OUTPATIENT
Start: 2025-05-13

## 2025-05-13 RX ORDER — FUROSEMIDE 10 MG/ML
80 INJECTION INTRAMUSCULAR; INTRAVENOUS ONCE
Status: COMPLETED | OUTPATIENT
Start: 2025-05-13 | End: 2025-05-13

## 2025-05-13 RX ADMIN — FUROSEMIDE 80 MG: 10 INJECTION, SOLUTION INTRAVENOUS at 14:09

## 2025-05-13 NOTE — PROGRESS NOTES
[facility] - [facility].O.R.E. Clinic    VORB from Carla Freeman, PAC:    80 mg IVP lasix once today.    Increase spironolactone to 25 mg daily starting 5/14/25  Increase lasix to 40 mg BID starting 5/14/25  Coordinate w/ current facility and update them pt wishes to transition to hospice  RTC in 3-4 weeks w/ repeat labs if not enrolled into hospice yet     Orders placed for IVP lasix 80 mg.      The following orders faxed to [facility], attn: Nursing (fax: [phone]), along w/ today's dictation notes from Carla Freeman, PAC.    On 5/14/25, increase spironolactone to 25 mg once daily  On 5/14/25, increase furosemide to 40 mg twice daily  Pt wishes to transition to hospice.  Please help coordinate as he would like done in own home.           Called TCU RN (ph:  [phone]) and spoke to Juanita. Updated her on above orders and hospice referral request to be coordinated by TCU provider.  Pt noted that he has not seen a provider yet while in TCU.        Shauna Hernández RN BSN   [facility] [place], [place]  C.O.R.E. Clinic Care Coordinator  05/13/25, 1:32 PM    [phone]

## 2025-05-13 NOTE — PROGRESS NOTES
[facility]: Heart Failure Care Coordination   Heart Failure Education    Situation/Background:      RN CC provided heart failure education to Eric and wife Garima during CORE Enrollment appt.    Assessment:      Living situation: home with family wife Garima.  Currently at [facility].      Barriers to Heart Failure follow-up: none noted.      Medication management: wife Garima when they are at [facility]     Intervention/Plan:      CM/HF education topics reviewed:  Low sodium: 2000 mg or less daily, meal choices and label reading   Fluid Restriction: 2 L daily   Daily weight monitoring and logging   Medication review and importance of compliance   Overview of C.O.R.E. clinic   Overview of heart failure appointments and testing   Symptoms of HF to be reported to Core Team      Education materials provided:  Palliative information handout     RN CC reviewed and reinforced fluid/dietary sodium restrictions; patient stated understanding.  Instructed patient to call RN line with new or worsening heart failure symptoms and/or rapid weight gain.  Patient made aware of future appointment(s) needed; request(s) routed to scheduling. RTC w/ Carla in 3-4 weeks w/ repeat labs if not set up with hospice in home yet.    Confirmed patient has clinic and scheduling phone numbers.       Future Appointments   Date Time Provider Department Center   6/4/2025  1:00 PM [facility]   6/4/2025  2:00 PM Carla Freeman PA-C [facility]       Patient expressed understanding of above education/instructions and denied further questions at this time.      Shauna Hernández, RN BSN   [facility] [place], [place]  C.O.R[facility]E. Clinic Care Coordinator  05/13/25, 1:45 PM    [phone]

## 2025-05-13 NOTE — PROGRESS NOTES
Infusion Nursing Note:  Eric Poole presents today for Lasix.    Patient seen by provider today: Yes: Was seen in cardiology   present during visit today: Not Applicable.    Note: N/A.      Intravenous Access:  Peripheral IV placed.    Treatment Conditions:  Not Applicable.      Post Infusion Assessment:  Patient tolerated infusion without incident.  Blood return noted pre and post infusion.  Site patent and intact, free from redness, edema or discomfort.  No evidence of extravasations.  Access discontinued per protocol.       Discharge Plan:   Discharge instructions reviewed with: Patient.  Patient and/or family verbalized understanding of discharge instructions and all questions answered.  AVS to patient via [employer].    Patient discharged in stable condition accompanied by: wife.  Departure Mode: Wheelchair.      Yoon Bellamy RN

## 2025-05-13 NOTE — Clinical Note
Pt was seen for Enrollment 5/13 w/ SD and actually prefers to transition to hospice.  He came from TCU post hospitalization.  I had requested TCU provider help assist in arranging and coordinating hospice in pt's own home as he would like to leave TCU.  See if pt has been able to go home yet on hospice.  If so, he doesn't need to come back to see SD on 6/4 w/ repeat labs.  If he hasn't been enrolled in hospice yet, SD still wants to see him.  See if facility can draw labs prior to OV so he doesn't have to come in 1 hr early and call wife to update her that we could cancel our lab appt here.

## 2025-05-13 NOTE — LETTER
5/13/2025    [facility]  [address]  [facility] [address]    RE: Eric Poole       Dear Colleague,     I had the pleasure of seeing Eric Poole in the [facility].    Cardiology C.O.R.E (heart failure specialty) Clinic Progress Note    Eric Poole MRN# [id number]   YOB: 1940 Age: 84 year old     Primary cardiology team: Dr. Armenta         Assessment and Plan     In summary, Eric Poole is a delightful patient whom I am meeting for the first time today, for a [facility] clinic enrollment visit.  He has end-stage heart failure / ischemic CMP for the past 7 years followed in [place], now establishing care with us after moving locally to an [facility] and recently admitted for acute HF exacerbation.  EF 15 to 20% with an LVIDD of 8 cm.  This is dilated from December 2024 at which point it was 7.1 cm.  Functionally, he has declined significantly over the past 7 months, and is unable to tolerate GDMT due to severe orthostatic hypotension on chronic midodrine, and issues with UTI's on SGLT2i, currently on a combination of furosemide 40 mg daily and spironolactone 12.5 mg daily.  His digoxin was recently stopped due to supratherapeutic levels in the hospital.  He is volume up today in rate controlled atrial fibrillation.  His proBNP yesterday was nearly 30,000.    Plan:  I had a nice conversation with Eric and Garima today.  We discussed goals of care, his prognosis, and what ongoing aggressive management would entail.  Eric understands that he is dying, and wants to spend the time he has left at home, with his family and making bird houses which is what brings him the most [place].  They are agreeable with hospice care referral.  I will reach out to his [facility] provider to see if there is a team that can come to meet them in-house.  In the interim, we will diurese him with an IV push of Lasix 80 mg today.  Tomorrow, increase his oral furosemide to 40 mg twice daily and  spironolactone to 25 mg daily.  Will arrange clinic follow-up at my next availability, which we can always cancel if he is home with hospice by then.  We discussed turning off his ICD today, but he wants to defer that for now.    It was a pleasure meeting Caridad today.     Carla Baez PA-C  [facility]         History of Presenting Illness     Eric Poole is a pleasant 84 year old patient with a pertinent history of   Acute on chronic HFrEF, ischemic cardiomyopathy since at least 2018, s/p ICD   Severe single-vessel coronary artery disease with hx of large anteroapical MI with inability to wire LAD/diag bifurcation lesion in 2018  Permanent atrial fibrillation, with hx of CVA, on Eliquis  Pulmonary hypertension with severe LETICIA, moderate COPD, history of chronic respiratory failure - has been noncompliant with CPAP for ~2 years, noted at the time machine was breaking. Has required supplemental O2 in the past   Chronic hypotension on chronic midodrine 10 mg three times daily   Resides in [place]  CKD stage IIIb  Hx of secondary erythrocytosis with therapeutic phlebotomies    In brief, this is a patient who previously was followed by cardiology (Dr. Prater) in [place], but has now moved into assisted living locally, admitted with a recurrent heart failure exacerbation and seen by our cardiology team (Dr. Armenta) at [facility].  EF 15-20% with severe dilation (LVIDd 8 cm) stable from echo in January, normal RV, with no pulmonary hypertension noted on the study although previously noted to be moderate, moderate MR.  He was diuresed, placed on a combination of furosemide 40 (+ an extra 20 mg PRN) and spironolactone 12.5, noted GDMT is overall limited due to issues with hypotension maintained chronically on midodrine.  He also has a history of UTIs with SGLT2 inhibitor therapy.  PTA digoxin was discontinued due to supratherapeutic levels, and it was recommended that if he developed issues with  "uncontrolled A-fib and AV node ablation should be pursued as he cannot tolerate beta-blocker therapy.  Noted to be end-stage heart failure, and in January during his last admission admission in [place], it is documented the patient stated he no longer wanted to take his cardiac medications and that he was referred for hospice evaluation.  However now referred to [facility] clinic as an outpatient.      Today, I am meeting Eric and his wife who are delightful and sharp. They state he'd done quite well over the past 7 years, but has had a significant decline over the past 7 months.  They are devastated that they need to be in a facility, feel they receive very poor care there, and would like to go back home.  He enjoys making bird houses and would like to return to that.  He tells me he's never short of breath but appears dyspneic with talking.  Garima tells me that he has PND and early morning dyspnea.  Legs are edematous, and lung sounds diffusely diminished.  Hospital discharge wt was 160 lbs. Over the past week his home scale has gone up from 162 --> 167 lbs. proBNP yesterday nearly 30K, unchanged from 5/2 in hospital. Reports they're very frustrated with the lack of appropriate diuretic therapy - states furosemide 60 isn't enough. Previously on 120 mg daily. Did not tolerate torsemide previously he states. He reports he's had no ICD therapies.    I reviewed the patient's prior medical history, including multiple diagnostic tests and clinic visits with his [place] cardiology team in [facility] Everywhere.         Review of Systems     12-pt ROS is negative except for as noted in the HPI.          Physical Exam     Vitals: /76 (BP Location: Right arm, Patient Position: Sitting, Cuff Size: Adult Small)   Pulse 60   Ht 1.803 m (5' 11\")   Wt 73.9 kg (163 lb)   SpO2 96%   BMI 22.73 kg/m    Wt Readings from Last 10 Encounters:   05/13/25 73.9 kg (163 lb)   05/07/25 72.3 kg (159 lb 4.8 oz)   06/04/19 96.6 kg (213 lb)   08/01/18 " 99.8 kg (220 lb)   10/20/15 103.4 kg (228 lb)   10/09/15 102.7 kg (226 lb 8 oz)   06/04/15 100 kg (220 lb 8 oz)   09/17/14 100 kg (220 lb 8 oz)   06/03/14 101.2 kg (223 lb)   09/25/13 101.2 kg (223 lb)       Constitutional:  Patient is pleasant, alert, cooperative, and in NAD.  HEENT:  NCAT. EOM's intact.   Neck:  CVP appears elevated.  Pulmonary: Appears dyspneic with speaking.  Diffusely diminished lung sounds.  Cardiac: Irregularly irregular rhythm, grade 2/6 systolic murmur at the left sternal border.  Extremities: 2+ pitting edema pedal to pretibial region bilaterally.  Neurological:  No gross motor or sensory deficits.   Psych: Appropriate affect.          Data   Labs reviewed:  Recent Labs   Lab Test 05/12/25  0557   TSH 2.12   NTBNP 28,309*       Lab Results   Component Value Date    WBC 7.2 05/12/2025    RBC 4.90 05/12/2025    HGB 14.8 05/12/2025    HGB 15.8 08/07/2012    HCT 47.1 05/12/2025    HCT 49.7 08/07/2012    MCV 96 05/12/2025    MCV 90.3 08/07/2012    MCH 30.2 05/12/2025    MCH 28.7 08/07/2012    MCHC 31.4 (L) 05/12/2025    MCHC 31.8 08/07/2012    RDW 16.4 (H) 05/12/2025    RDW 14.2 08/07/2012     05/12/2025       Lab Results   Component Value Date     05/12/2025     06/04/2015    POTASSIUM 4.2 05/12/2025    POTASSIUM 4.3 09/26/2015    CHLORIDE 104 05/12/2025    CHLORIDE 104 06/04/2015    CO2 23 05/12/2025    CO2 26 06/04/2015    ANIONGAP 14 05/12/2025    ANIONGAP 8.5 06/04/2015    GLC 91 05/12/2025    GLC 92 10/20/2015    BUN 44.9 (H) 05/12/2025    BUN 27 06/04/2015    CR 1.17 05/12/2025    CR 1.41 (A) 09/26/2015    GFRESTIMATED 61 05/12/2025    GFRESTIMATED 49 (A) 09/26/2015    GFRESTBLACK 59 (A) 09/26/2015    ESTEFANÍA 9.3 05/12/2025    ESTEFANÍA 8.5 06/04/2015      Lab Results   Component Value Date    AST 24 05/02/2025    AST 28 09/26/2015    ALT 9 05/02/2025    ALT 37 10/20/2015       Lab Results   Component Value Date    A1C 5.7 10/20/2015       Lab Results   Component Value Date     INR 1.65 (H) 05/04/2025    INR 1.67 (H) 05/03/2025    INR 1.6 (A) 10/23/2015    INR 1.1 09/26/2015    INR 1.75 (H) 06/28/2008            Problem List     Patient Active Problem List   Diagnosis     Malignant neoplasm of prostate (H)     BPH (benign prostatic hypertrophy)     Hypertension goal BP (blood pressure) < 140/90     CARDIOVASCULAR SCREENING; LDL GOAL LESS THAN 130     Gastroesophageal reflux disease without esophagitis     Hyperlipidemia with target LDL less than 100     Cerebral embolism with cerebral infarction (H)     Paroxysmal atrial fibrillation (H)     LETICIA (obstructive sleep apnea)     Pleural effusion     Elevated troponin     DIEGO (acute kidney injury)     Elevated brain natriuretic peptide (BNP) level            Medications     Current Outpatient Medications   Medication Sig Dispense Refill     apixaban ANTICOAGULANT (ELIQUIS) 2.5 MG tablet Take 2.5 mg by mouth 2 times daily.       aspirin 81 MG EC tablet Take 81 mg by mouth every evening.       atorvastatin (LIPITOR) 40 MG tablet Take 40 mg by mouth every evening.       furosemide (LASIX) 20 MG tablet Take 1 tablet (20 mg) by mouth daily as needed (wt gain >3 lbs in 1 day or >5 lbs in 1 week).       furosemide (LASIX) 40 MG tablet Take 1 tablet (40 mg) by mouth daily.       meloxicam (MOBIC) 7.5 MG tablet Take 7.5 mg by mouth daily.       midodrine (PROAMATINE) 10 MG tablet Take 1 tablet (10 mg) by mouth 3 times daily (with meals).       omeprazole (PRILOSEC) 20 MG capsule Take 1 capsule (20 mg) by mouth daily 90 capsule 3     spironolactone (ALDACTONE) 12.5 mg TABS half-tab Take 12.5 mg by mouth daily.       Vitamin D3 (CHOLECALCIFEROL) 25 mcg (1000 units) tablet Take 25 mcg by mouth every evening.              Past Medical History     Past Medical History:   Diagnosis Date     BPH (benign prostatic hypertrophy) 5/14/2013     GERD (gastroesophageal reflux disease) 5/14/2013     Hypertension goal BP (blood pressure) < 140/90      Malignant  neoplasm of prostate (H) 2011     Past Surgical History:   Procedure Laterality Date     CATARACT EXTRACTION Bilateral      ELBOW SURGERY      left     ELBOW SURGERY Left      EYE SURGERY  2012    bilateral cataract removal     KNEE SURGERY      arthroscopic meniscal repair on left      KNEE SURGERY      right knee fracture and repair     KNEE SURGERY      right knee TKA     TOTAL KNEE ARTHROPLASTY Right      TOTAL KNEE ARTHROPLASTY Left 2018     wisdom teeth removal  age 39     Family History   Problem Relation Age of Onset     Hypertension Mother      Arthritis Mother         osteoarthritis     Cardiovascular Mother         MI at 93 years old     Hypertension Father      Cardiovascular Father         MI at 77 years old     Arthritis Paternal Uncle         osteoarthritis     Arthritis Paternal Uncle         osteoarthritis     Arthritis Paternal Uncle         osteoarthritis     Arthritis Paternal Aunt         osteoarthritis     Arthritis Paternal Aunt         osteoarthritis     Osteoarthritis Mother      Acute Myocardial Infarction Father      Social History     Socioeconomic History     Marital status:      Spouse name: Garima     Number of children: 3     Years of education: 13     Highest education level: Not on file   Occupational History     Employer: RETIRED     Comment: retired [employer]   Tobacco Use     Smoking status: Former     Current packs/day: 0.00     Average packs/day: 0.5 packs/day for 50.0 years (25.0 ttl pk-yrs)     Types: Cigarettes     Start date: 1957     Quit date: 2007     Years since quittin.0     Smokeless tobacco: Never   Substance and Sexual Activity     Alcohol use: No     Comment: none - 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 Not Asked      Service Not Asked     Blood Transfusions Not Asked      Caffeine Concern Not Asked     Occupational Exposure Not Asked     Hobby Hazards Not Asked     Sleep Concern Not Asked     Stress Concern Not Asked     Weight Concern Not Asked     Special Diet Not Asked     Back Care Not Asked     Exercise Not Asked     Bike Helmet Yes     Seat Belt Yes     Self-Exams Not Asked   Social History Narrative     Not on file     Social Drivers of Health     Financial Resource Strain: Low Risk  (5/2/2025)    Financial Resource Strain      Within the past 12 months, have you or your family members you live with been unable to get utilities (heat, electricity) when it was really needed?: No   Food Insecurity: Low Risk  (5/2/2025)    Food Insecurity      Within the past 12 months, did you worry that your food would run out before you got money to buy more?: No      Within the past 12 months, did the food you bought just not last and you didn t have money to get more?: No   Transportation Needs: Low Risk  (5/2/2025)    Transportation Needs      Within the past 12 months, has lack of transportation kept you from medical appointments, getting your medicines, non-medical meetings or appointments, work, or from getting things that you need?: No   Physical Activity: Not on file   Stress: Not on file   Social Connections: Socially Integrated (5/1/2025)    [facility]    Social Connections      Do you often feel lonely or isolated from those around you?: 0   Interpersonal Safety: Low Risk  (5/3/2025)    Interpersonal Safety      Do you feel physically and emotionally safe where you currently live?: Yes      Within the past 12 months, have you been hit, slapped, kicked or otherwise physically hurt by someone?: No      Within the past 12 months, have you been humiliated or emotionally abused in other ways by your partner or ex-partner?: No   Housing Stability: Low Risk  (5/2/2025)    Housing Stability      Do you have housing? : Yes      Are you worried  about losing your housing?: No            Allergies     Patient has no known allergies.    Today's clinic visit entailed:  I spent a total of 70 minutes on the day of the visit.   Time spent by me today doing chart review, history and exam, documentation and further activities per the note  Provider  Link to [facility] Help Grid     The level of medical decision making during this visit was of high complexity.      Thank you for allowing me to participate in the care of your patient.      Sincerely,     Carla Freeman PA-C     [facility]  cc:   Antonieta Armenta DO  [address]  [place] [address]

## 2025-05-13 NOTE — PROGRESS NOTES
Cardiology C.O.R.E (heart failure specialty) Clinic Progress Note    Eric Poole MRN# [id number]   YOB: 1940 Age: 84 year old     Primary cardiology team: Dr. Armenta         Assessment and Plan     In summary, Eric Poole is a delightful patient whom I am meeting for the first time today, for a [facility] clinic enrollment visit.  He has end-stage heart failure / ischemic CMP for the past 7 years followed in [place], now establishing care with us after moving locally to an nursing home and recently admitted for acute HF exacerbation.  EF 15 to 20% with an LVIDD of 8 cm.  This is dilated from December 2024 at which point it was 7.1 cm.  Functionally, he has declined significantly over the past 7 months, and is unable to tolerate GDMT due to severe orthostatic hypotension on chronic midodrine, and issues with UTI's on SGLT2i, currently on a combination of furosemide 40 mg daily and spironolactone 12.5 mg daily.  His digoxin was recently stopped due to supratherapeutic levels in the hospital.  He is volume up today in rate controlled atrial fibrillation.  His proBNP yesterday was nearly 30,000.    Plan:  I had a good conversation with Eric and Garima today.  We discussed goals of care, his prognosis, and what ongoing aggressive management would entail.  Eric understands that he is dying, and wants to spend the time he has left at home, with his family and making bird houses which is what brings him the most [place]. He wants to limit his time in clinics and hospitals. They are agreeable with hospice care referral.  I will reach out to his nursing home provider to see if there is a team that can come to meet them in-house.  In the interim, we will diurese him with an IV push of Lasix 80 mg today.  Tomorrow, increase his oral furosemide to 40 mg twice daily and spironolactone to 25 mg daily, and can continue to up-titrate his diuretics as needed.  Will arrange clinic follow-up at my next availability, which we can always cancel if  he is home with hospice by then.  We discussed turning off his ICD today, but he wants to defer that for now.    It was a pleasure meeting Caridad today.     XIOM[facility]         History of Presenting Illness     Eric Poole is a pleasant 84 year old patient with a pertinent history of   Acute on chronic HFrEF, ischemic cardiomyopathy since at least 2018, s/p ICD   Severe single-vessel coronary artery disease with hx of large anteroapical MI with inability to wire LAD/diag bifurcation lesion in 2018  Permanent atrial fibrillation, with hx of CVA, on Eliquis  Pulmonary hypertension with severe LETICIA, moderate COPD, history of chronic respiratory failure - has been noncompliant with CPAP for ~2 years, noted at the time machine was breaking. Has required supplemental O2 in the past   Chronic hypotension on chronic midodrine 10 mg three times daily   Resides in [place]  CKD stage IIIb  Hx of secondary erythrocytosis with therapeutic phlebotomies    In brief, this is a patient who previously was followed by cardiology (Dr. Prater) in [place], but has now moved into assisted living locally, admitted with a recurrent heart failure exacerbation and seen by our cardiology team (Dr. Armenta) at [facility] [place].  EF 15-20% with severe dilation (LVIDd 8 cm) stable from echo in January, normal RV, with no pulmonary hypertension noted on the study although previously noted to be moderate, moderate MR.  He was diuresed, placed on a combination of furosemide 40 (+ an extra 20 mg PRN) and spironolactone 12.5, noted GDMT is overall limited due to issues with hypotension maintained chronically on midodrine.  He also has a history of UTIs with SGLT2 inhibitor therapy.  PTA digoxin was discontinued due to supratherapeutic levels, and it was recommended that if he developed issues with uncontrolled A-fib and AV node ablation should be pursued as he cannot tolerate beta-blocker therapy.  Noted to be  "end-stage heart failure, and in January during his last admission admission in [place], it is documented the patient stated he no longer wanted to take his cardiac medications and that hospice evaluation should be considered.  However now referred to [facility] clinic as an outpatient.      Today, I am meeting Eric and his wife who are delightful and sharp. They state he'd done quite well over the past 7 years, but has had a significant decline over the past 7 months.  They are devastated that they need to be in a facility, feel they receive poor care there, and would like to go back home.  He enjoys making bird houses and would like to return to that.  He tells me he's never short of breath but appears dyspneic with talking.  Garima tells me that he has PND and early morning dyspnea.  Legs are edematous, and lung sounds diffusely diminished.  Hospital discharge wt was 160 lbs. Over the past week his home scale has gone up from 162 --> 167 lbs. proBNP yesterday nearly 30K, unchanged from 5/2 in hospital. Reports they're very frustrated with the lack of appropriate diuretic therapy - states furosemide 60 isn't enough. Previously on 120 mg daily. Did not tolerate torsemide previously he states. He reports he's had no ICD therapies.    I reviewed the patient's prior medical history, including multiple diagnostic tests and clinic visits with his [place] cardiology team in Care Everywhere.         Review of Systems     12-pt ROS is negative except for as noted in the HPI.          Physical Exam     Vitals: /76 (BP Location: Right arm, Patient Position: Sitting, Cuff Size: Adult Small)   Pulse 60   Ht 1.803 m (5' 11\")   Wt 73.9 kg (163 lb)   SpO2 96%   BMI 22.73 kg/m    Wt Readings from Last 10 Encounters:   05/13/25 73.9 kg (163 lb)   05/07/25 72.3 kg (159 lb 4.8 oz)   06/04/19 96.6 kg (213 lb)   08/01/18 99.8 kg (220 lb)   10/20/15 103.4 kg (228 lb)   10/09/15 102.7 kg (226 lb 8 oz)   06/04/15 100 kg (220 lb 8 oz) "   09/17/14 100 kg (220 lb 8 oz)   06/03/14 101.2 kg (223 lb)   09/25/13 101.2 kg (223 lb)       Constitutional:  Patient is pleasant, alert, cooperative, and in NAD.  HEENT:  NCAT. EOM's intact.   Neck:  CVP appears elevated.  Pulmonary: Appears dyspneic with speaking.  Diffusely diminished lung sounds.  Cardiac: Irregularly irregular rhythm, grade 2/6 systolic murmur at the left sternal border.  Extremities: 2+ pitting edema pedal to pretibial region bilaterally.  Neurological:  No gross motor or sensory deficits.   Psych: Appropriate affect.          Data   Labs reviewed:  Recent Labs   Lab Test 05/12/25  0557   TSH 2.12   NTBNP 28,309*       Lab Results   Component Value Date    WBC 7.2 05/12/2025    RBC 4.90 05/12/2025    HGB 14.8 05/12/2025    HGB 15.8 08/07/2012    HCT 47.1 05/12/2025    HCT 49.7 08/07/2012    MCV 96 05/12/2025    MCV 90.3 08/07/2012    MCH 30.2 05/12/2025    MCH 28.7 08/07/2012    MCHC 31.4 (L) 05/12/2025    MCHC 31.8 08/07/2012    RDW 16.4 (H) 05/12/2025    RDW 14.2 08/07/2012     05/12/2025       Lab Results   Component Value Date     05/12/2025     06/04/2015    POTASSIUM 4.2 05/12/2025    POTASSIUM 4.3 09/26/2015    CHLORIDE 104 05/12/2025    CHLORIDE 104 06/04/2015    CO2 23 05/12/2025    CO2 26 06/04/2015    ANIONGAP 14 05/12/2025    ANIONGAP 8.5 06/04/2015    GLC 91 05/12/2025    GLC 92 10/20/2015    BUN 44.9 (H) 05/12/2025    BUN 27 06/04/2015    CR 1.17 05/12/2025    CR 1.41 (A) 09/26/2015    GFRESTIMATED 61 05/12/2025    GFRESTIMATED 49 (A) 09/26/2015    GFRESTBLACK 59 (A) 09/26/2015    ESTEFANÍA 9.3 05/12/2025    ESTEFANÍA 8.5 06/04/2015      Lab Results   Component Value Date    AST 24 05/02/2025    AST 28 09/26/2015    ALT 9 05/02/2025    ALT 37 10/20/2015       Lab Results   Component Value Date    A1C 5.7 10/20/2015       Lab Results   Component Value Date    INR 1.65 (H) 05/04/2025    INR 1.67 (H) 05/03/2025    INR 1.6 (A) 10/23/2015    INR 1.1 09/26/2015    INR 1.75  (H) 06/28/2008            Problem List     Patient Active Problem List   Diagnosis    Malignant neoplasm of prostate (H)    BPH (benign prostatic hypertrophy)    Hypertension goal BP (blood pressure) < 140/90    CARDIOVASCULAR SCREENING; LDL GOAL LESS THAN 130    Gastroesophageal reflux disease without esophagitis    Hyperlipidemia with target LDL less than 100    Cerebral embolism with cerebral infarction (H)    Paroxysmal atrial fibrillation (H)    LETICIA (obstructive sleep apnea)    Pleural effusion    Elevated troponin    DIEGO (acute kidney injury)    Elevated brain natriuretic peptide (BNP) level            Medications     Current Outpatient Medications   Medication Sig Dispense Refill    apixaban ANTICOAGULANT (ELIQUIS) 2.5 MG tablet Take 2.5 mg by mouth 2 times daily.      aspirin 81 MG EC tablet Take 81 mg by mouth every evening.      atorvastatin (LIPITOR) 40 MG tablet Take 40 mg by mouth every evening.      furosemide (LASIX) 20 MG tablet Take 1 tablet (20 mg) by mouth daily as needed (wt gain >3 lbs in 1 day or >5 lbs in 1 week).      furosemide (LASIX) 40 MG tablet Take 1 tablet (40 mg) by mouth daily.      meloxicam (MOBIC) 7.5 MG tablet Take 7.5 mg by mouth daily.      midodrine (PROAMATINE) 10 MG tablet Take 1 tablet (10 mg) by mouth 3 times daily (with meals).      omeprazole (PRILOSEC) 20 MG capsule Take 1 capsule (20 mg) by mouth daily 90 capsule 3    spironolactone (ALDACTONE) 12.5 mg TABS half-tab Take 12.5 mg by mouth daily.      Vitamin D3 (CHOLECALCIFEROL) 25 mcg (1000 units) tablet Take 25 mcg by mouth every evening.              Past Medical History     Past Medical History:   Diagnosis Date    BPH (benign prostatic hypertrophy) 5/14/2013    GERD (gastroesophageal reflux disease) 5/14/2013    Hypertension goal BP (blood pressure) < 140/90     Malignant neoplasm of prostate (H) 5/11/2011     Past Surgical History:   Procedure Laterality Date    CATARACT EXTRACTION Bilateral     ELBOW SURGERY       left    ELBOW SURGERY Left     EYE SURGERY  2012    bilateral cataract removal    KNEE SURGERY  2001    arthroscopic meniscal repair on left     KNEE SURGERY  1960    right knee fracture and repair    KNEE SURGERY  2008    right knee TKA    TOTAL KNEE ARTHROPLASTY Right     TOTAL KNEE ARTHROPLASTY Left 2018    wisdom teeth removal  age 39     Family History   Problem Relation Age of Onset    Hypertension Mother     Arthritis Mother         osteoarthritis    Cardiovascular Mother         MI at 93 years old    Hypertension Father     Cardiovascular Father         MI at 77 years old    Arthritis Paternal Uncle         osteoarthritis    Arthritis Paternal Uncle         osteoarthritis    Arthritis Paternal Uncle         osteoarthritis    Arthritis Paternal Aunt         osteoarthritis    Arthritis Paternal Aunt         osteoarthritis    Osteoarthritis Mother     Acute Myocardial Infarction Father      Social History     Socioeconomic History    Marital status:      Spouse name: Garima    Number of children: 3    Years of education: 13    Highest education level: Not on file   Occupational History     Employer: RETIRED     Comment: retired [employer]   Tobacco Use    Smoking status: Former     Current packs/day: 0.00     Average packs/day: 0.5 packs/day for 50.0 years (25.0 ttl pk-yrs)     Types: Cigarettes     Start date: 1957     Quit date: 2007     Years since quittin.0    Smokeless tobacco: Never   Substance and Sexual Activity    Alcohol use: No     Comment: none - 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 Not Asked     Service Not Asked    Blood Transfusions Not Asked    Caffeine Concern Not Asked    Occupational Exposure Not Asked    Hobby Hazards Not Asked    Sleep Concern Not Asked    Stress Concern Not Asked    Weight Concern Not Asked     Special Diet Not Asked    Back Care Not Asked    Exercise Not Asked    Bike Helmet Yes    Seat Belt Yes    Self-Exams Not Asked   Social History Narrative    Not on file     Social Drivers of Health     Financial Resource Strain: Low Risk  (5/2/2025)    Financial Resource Strain     Within the past 12 months, have you or your family members you live with been unable to get utilities (heat, electricity) when it was really needed?: No   Food Insecurity: Low Risk  (5/2/2025)    Food Insecurity     Within the past 12 months, did you worry that your food would run out before you got money to buy more?: No     Within the past 12 months, did the food you bought just not last and you didn t have money to get more?: No   Transportation Needs: Low Risk  (5/2/2025)    Transportation Needs     Within the past 12 months, has lack of transportation kept you from medical appointments, getting your medicines, non-medical meetings or appointments, work, or from getting things that you need?: No   Physical Activity: Not on file   Stress: Not on file   Social Connections: Socially Integrated (5/1/2025)    [facility]    Social Connections     Do you often feel lonely or isolated from those around you?: 0   Interpersonal Safety: Low Risk  (5/3/2025)    Interpersonal Safety     Do you feel physically and emotionally safe where you currently live?: Yes     Within the past 12 months, have you been hit, slapped, kicked or otherwise physically hurt by someone?: No     Within the past 12 months, have you been humiliated or emotionally abused in other ways by your partner or ex-partner?: No   Housing Stability: Low Risk  (5/2/2025)    Housing Stability     Do you have housing? : Yes     Are you worried about losing your housing?: No            Allergies     Patient has no known allergies.    Today's clinic visit entailed:  I spent a total of 70 minutes on the day of the visit.   Time spent by me  today doing chart review, history and exam, documentation and further activities per the note  Provider  Link to MDM Help Grid     The level of medical decision making during this visit was of high complexity.

## 2025-05-14 ENCOUNTER — PATIENT OUTREACH (OUTPATIENT)
Dept: CARDIOLOGY | Facility: CLINIC | Age: 85
End: 2025-05-14
Payer: COMMERCIAL

## 2025-05-14 NOTE — PROGRESS NOTES
"[facility]: Heart Failure Care Coordination   Post-Diuretic Outreach     Situation/Background:      Chief Complaint   Patient presents with    Clinic Care Coordination - Follow-up     Post IVP furosemide      Medication(s) administered:    Furosemide (Lasix) 80 mg IVP     Date received: 5/13/25    Assessment:      Called TCU nursing line (ph: [phone]) to get update on wt, VS, sx. Wt unchanged, but his breathing is \"normal\" and he \"looks fine\".  They have wraps to legs, so she didn't assess BLE edema.  She confirms pt received increased doses of spironolactone and furosemide today.     Wife is there now for today's scheduled care conference.  Requested TCU nurse confirm that TCU provider is aware of pt's wishes to transition to home hospice and request that provider assist in coordinating and enrolling pt.      Recent Weights:  5/7 159 lbs (hospital discharge wt)  5/13 163 lbs (pt reported)  5/13 167.8 lbs (TCU reported)  5/14 167.7 lbs (TCU reported- same scale)    Current goal weight:? not documented      Medication changes made: Yes, today 5/15 pt to increase furosemide to 40 mg BID (from 20 mg daily) and increase spironolactone to 25 mg daily (from 12.5 mg daily).    Does patient have CardioMEMS?   No    Intervention/Plan:      Current daily diuretic:?    Furosemide 40 mg BID  Spironolactone 25 mg daily    RN CC routing to provider for review.    Future Appointments   Date Time Provider Department Center   6/4/2025  1:00 PM [facility]   6/4/2025  2:00 PM Carla Freeman PA-C RUUMHT [facility]        Shauna Hernández RN BSN   [facility]- [place], [place]  C.O.R.E. Clinic Care Coordinator  05/14/25, 11:08 AM    [phone]    "

## 2025-05-15 LAB
ANION GAP SERPL CALCULATED.3IONS-SCNC: 15 MMOL/L (ref 7–15)
BUN SERPL-MCNC: 49.8 MG/DL (ref 8–23)
CALCIUM SERPL-MCNC: 9.3 MG/DL (ref 8.8–10.4)
CHLORIDE SERPL-SCNC: 103 MMOL/L (ref 98–107)
CREAT SERPL-MCNC: 1.27 MG/DL (ref 0.67–1.17)
EGFRCR SERPLBLD CKD-EPI 2021: 55 ML/MIN/1.73M2
ERYTHROCYTE [DISTWIDTH] IN BLOOD BY AUTOMATED COUNT: 16.3 % (ref 10–15)
GLUCOSE SERPL-MCNC: 93 MG/DL (ref 70–99)
HCO3 SERPL-SCNC: 22 MMOL/L (ref 22–29)
HCT VFR BLD AUTO: 48.4 % (ref 40–53)
HGB BLD-MCNC: 15.1 G/DL (ref 13.3–17.7)
MCH RBC QN AUTO: 29.8 PG (ref 26.5–33)
MCHC RBC AUTO-ENTMCNC: 31.2 G/DL (ref 31.5–36.5)
MCV RBC AUTO: 96 FL (ref 78–100)
PLATELET # BLD AUTO: 220 10E3/UL (ref 150–450)
POTASSIUM SERPL-SCNC: 4.8 MMOL/L (ref 3.4–5.3)
RBC # BLD AUTO: 5.06 10E6/UL (ref 4.4–5.9)
SODIUM SERPL-SCNC: 140 MMOL/L (ref 135–145)
WBC # BLD AUTO: 7.3 10E3/UL (ref 4–11)

## 2025-05-15 PROCEDURE — 36415 COLL VENOUS BLD VENIPUNCTURE: CPT | Mod: ORL | Performed by: INTERNAL MEDICINE

## 2025-05-15 PROCEDURE — 80048 BASIC METABOLIC PNL TOTAL CA: CPT | Mod: ORL | Performed by: INTERNAL MEDICINE

## 2025-05-15 PROCEDURE — P9604 ONE-WAY ALLOW PRORATED TRIP: HCPCS | Mod: ORL | Performed by: INTERNAL MEDICINE

## 2025-05-15 PROCEDURE — 85027 COMPLETE CBC AUTOMATED: CPT | Mod: ORL | Performed by: INTERNAL MEDICINE
